# Patient Record
Sex: FEMALE | Race: WHITE | NOT HISPANIC OR LATINO | Employment: FULL TIME | URBAN - METROPOLITAN AREA
[De-identification: names, ages, dates, MRNs, and addresses within clinical notes are randomized per-mention and may not be internally consistent; named-entity substitution may affect disease eponyms.]

---

## 2024-06-24 ENCOUNTER — TELEPHONE (OUTPATIENT)
Dept: OBSTETRICS AND GYNECOLOGY | Facility: CLINIC | Age: 29
End: 2024-06-24

## 2024-06-24 NOTE — TELEPHONE ENCOUNTER
Hub staff attempted to follow warm transfer process and was unsuccessful     Caller: LUKAS ALLISON    Relationship to patient: SELF    Best call back number: [phone]    Patient is needing: PT IS CALLING IN NEEDING TO RESCHEDULE HER OB APPT DUE TO HER [place] BEING [place] OF [place]OWN. SHE WAS TOLD THERE WAS AN APPT AVAILABLE ON 07/11 @ 12:30 PM AND WOULD LIKE TO KNOW IF THIS WAS STILL AVAILABLE

## 2024-07-11 ENCOUNTER — INITIAL PRENATAL (OUTPATIENT)
Dept: OBSTETRICS AND GYNECOLOGY | Facility: CLINIC | Age: 29
End: 2024-07-11
Payer: COMMERCIAL

## 2024-07-11 VITALS — WEIGHT: 142.6 LBS | SYSTOLIC BLOOD PRESSURE: 110 MMHG | DIASTOLIC BLOOD PRESSURE: 68 MMHG

## 2024-07-11 DIAGNOSIS — Z98.890 HISTORY OF LOOP ELECTRICAL EXCISION PROCEDURE (LEEP): ICD-10-CM

## 2024-07-11 DIAGNOSIS — Z34.91 PRENATAL CARE IN FIRST TRIMESTER: Primary | ICD-10-CM

## 2024-07-11 DIAGNOSIS — Z3A.08 8 WEEKS GESTATION OF PREGNANCY: ICD-10-CM

## 2024-07-11 PROBLEM — Z34.90 PREGNANCY: Status: ACTIVE | Noted: 2024-07-11

## 2024-07-11 RX ORDER — CETIRIZINE HYDROCHLORIDE 10 MG/1
10 TABLET ORAL DAILY
COMMUNITY

## 2024-07-11 RX ORDER — TACROLIMUS 1 MG/G
OINTMENT TOPICAL
COMMUNITY
Start: 2024-06-13

## 2024-07-11 RX ORDER — BACILLUS COAGULANS/INULIN 1B-250 MG
CAPSULE ORAL
COMMUNITY

## 2024-07-11 RX ORDER — CICLOPIROX 7.7 MG/G
GEL TOPICAL
COMMUNITY
Start: 2024-06-14

## 2024-07-11 NOTE — PROGRESS NOTES
Initial ob visit     CC- Here for care of pregnancy        Ariane Vasquez is a 29 y.o. female, , who presents for her first obstetrical visit.  Patient's last menstrual period was 2024.. Her GISELLE is 2025, by Last Menstrual Period. Current GA is 8w4d.     Initial positive test date : 24, UPT        Her periods are every 4 weeks.  Prior obstetric issues: none  Patient's past medical history is significant for:  Denies .  Family history of genetic issues (includes FOB): Denies  Prior infections concerning in pregnancy (Rash, fever in last 2 weeks): No  Varicella Hx - history of chicken pox  Prior testing for Cystic Fibrosis Carrier or Sickle Cell Trait- Denies  Prepregnancy BMI - There is no height or weight on file to calculate BMI.  History of STD: no  Hx of HSV for patient or partner: no  Ultrasound Today: yes    OB History    Para Term  AB Living   1             SAB IAB Ectopic Molar Multiple Live Births                    # Outcome Date GA Lbr Ashutosh/2nd Weight Sex Type Anes PTL Lv   1 Current                Additional Pertinent History   Last Pap : 2024 Result: negative HPV: negative     Last Completed Pap Smear       This patient has no relevant Health Maintenance data.          History of abnormal Pap smear: yes - LEEP 2018  Family history of uterine, colon, breast, or ovarian cancer: yes - MGM- Breast cancer  Feelings of Anxiety or Depression: no  Tobacco Usage?: No   Alcohol/Drug Use?: NO  Over the age of 35 at delivery: no  Genetic Screening: desires cell free DNA    PMH    Current Outpatient Medications:     Bacillus Coagulans-Inulin (Probiotic) 1-250 BILLION-MG capsule, , Disp: , Rfl:     cetirizine (zyrTEC) 10 MG tablet, Take 1 tablet by mouth Daily., Disp: , Rfl:     ciclopirox (LOPROX) 0.77 % gel, APPLY DAILY TO NAIL PLATES, Disp: , Rfl:     Omega-3 Fatty Acids (Fish Oil) 300 MG capsule, , Disp: , Rfl:     Prenatal Multivit-Min-Fe-FA (PRENATAL 1 + IRON PO), Take   by mouth., Disp: , Rfl:     tacrolimus (PROTOPIC) 0.1 % ointment, apply to affected area twice a day, Disp: , Rfl:      Past Medical History:   Diagnosis Date    Abnormal Pap smear of cervix     HPV (human papilloma virus) infection 2018    S/p colposcopy and leep, All normal paps since that time    Pregnancy 7/11/2024        Past Surgical History:   Procedure Laterality Date    APPENDECTOMY      CERVICAL BIOPSY  W/ LOOP ELECTRODE EXCISION  2018    WISDOM TOOTH EXTRACTION         Review of Systems   Review of Systems    Patient Reports:  Nausea  Patient Denies:excessive nausea , excessive vomiting, and vaginal bleeding  All systems reviewed and otherwise normal.    I have reviewed and agree with the HPI, ROS, and historical information as entered above.       /68   Wt 64.7 kg (142 lb 9.6 oz)   LMP 05/12/2024     The additional following portions of the patient's history were reviewed and updated as appropriate: allergies, current medications, past family history, past medical history, past social history, past surgical history, and problem list.    Physical Exam  General:  well developed; well nourished  no acute distress   Chest/Respiratory: No labored breathing, normal respiratory effort, normal appearance, no respiratory noises noted   Heart:  not examined   Thyroid: not examined   Breasts:  Not performed.   Abdomen: Not performed.   Pelvis: Not performed.        Assessment and Plan    Problem List Items Addressed This Visit       Pregnancy    Overview     cfDNA         History of loop electrical excision procedure (LEEP)    Overview     2018          Other Visit Diagnoses       Prenatal care in first trimester    -  Primary    Relevant Orders    Obstetric Panel    HIV-1 / O / 2 Ag / Antibody    Urine Culture - Urine, Urine, Clean Catch    Chlamydia trachomatis, Neisseria gonorrhoeae, PCR - Urine, Urine, Clean Catch    Urine Drug Screen - Urine, Clean Catch            Pregnancy at 8w4d  Reviewed routine  prenatal care with the office and educational materials given  Lab(s) Ordered  Discussed options for genetic testing including first trimester nuchal translucency screen, genetic disease carrier testing, quadruple screen, and NIPT  Patient is on Prenatal vitamins  Activity recommendation : 150 minutes/week of moderate intensity aerobic activity unless we limit for bleeding, hypertension or other pregnancy complication   Return in about 1 month (around 8/11/2024) for F/U Prenatal.      Tiffani Prince MD  07/11/2024

## 2024-07-12 LAB
ABO GROUP BLD: ABNORMAL
AMPHETAMINES UR QL SCN: NEGATIVE NG/ML
BARBITURATES UR QL SCN: NEGATIVE NG/ML
BASOPHILS # BLD AUTO: 0 X10E3/UL (ref 0–0.2)
BASOPHILS NFR BLD AUTO: 0 %
BENZODIAZ UR QL SCN: NEGATIVE NG/ML
BLD GP AB SCN SERPL QL: NEGATIVE
BZE UR QL SCN: NEGATIVE NG/ML
CANNABINOIDS UR QL SCN: NEGATIVE NG/ML
CREAT UR-MCNC: 157.8 MG/DL (ref 20–300)
EOSINOPHIL # BLD AUTO: 0 X10E3/UL (ref 0–0.4)
EOSINOPHIL NFR BLD AUTO: 1 %
ERYTHROCYTE [DISTWIDTH] IN BLOOD BY AUTOMATED COUNT: 11.7 % (ref 11.7–15.4)
HBV SURFACE AG SERPL QL IA: NEGATIVE
HCT VFR BLD AUTO: 40.9 % (ref 34–46.6)
HCV IGG SERPL QL IA: NON REACTIVE
HGB BLD-MCNC: 13.3 G/DL (ref 11.1–15.9)
HIV 1+2 AB+HIV1 P24 AG SERPL QL IA: NON REACTIVE
IMM GRANULOCYTES # BLD AUTO: 0 X10E3/UL (ref 0–0.1)
IMM GRANULOCYTES NFR BLD AUTO: 0 %
LABORATORY COMMENT REPORT: NORMAL
LYMPHOCYTES # BLD AUTO: 1.8 X10E3/UL (ref 0.7–3.1)
LYMPHOCYTES NFR BLD AUTO: 25 %
MCH RBC QN AUTO: 31.4 PG (ref 26.6–33)
MCHC RBC AUTO-ENTMCNC: 32.5 G/DL (ref 31.5–35.7)
MCV RBC AUTO: 97 FL (ref 79–97)
METHADONE UR QL SCN: NEGATIVE NG/ML
MONOCYTES # BLD AUTO: 0.5 X10E3/UL (ref 0.1–0.9)
MONOCYTES NFR BLD AUTO: 7 %
NEUTROPHILS # BLD AUTO: 4.8 X10E3/UL (ref 1.4–7)
NEUTROPHILS NFR BLD AUTO: 67 %
OPIATES UR QL SCN: NEGATIVE NG/ML
OXYCODONE+OXYMORPHONE UR QL SCN: NEGATIVE NG/ML
PCP UR QL: NEGATIVE NG/ML
PH UR: 5.3 [PH] (ref 4.5–8.9)
PLATELET # BLD AUTO: 244 X10E3/UL (ref 150–450)
PROPOXYPH UR QL SCN: NEGATIVE NG/ML
RBC # BLD AUTO: 4.24 X10E6/UL (ref 3.77–5.28)
RH BLD: POSITIVE
RPR SER QL: NON REACTIVE
RUBV IGG SERPL IA-ACNC: <0.9 INDEX
WBC # BLD AUTO: 7.1 X10E3/UL (ref 3.4–10.8)

## 2024-07-13 LAB
BACTERIA UR CULT: NORMAL
BACTERIA UR CULT: NORMAL
C TRACH RRNA SPEC QL NAA+PROBE: NEGATIVE
N GONORRHOEA RRNA SPEC QL NAA+PROBE: NEGATIVE

## 2024-07-26 ENCOUNTER — LAB (OUTPATIENT)
Dept: OBSTETRICS AND GYNECOLOGY | Facility: CLINIC | Age: 29
End: 2024-07-26
Payer: COMMERCIAL

## 2024-07-26 DIAGNOSIS — Z34.01 PREGNANCY, FIRST, FIRST TRIMESTER: Primary | ICD-10-CM

## 2024-08-09 ENCOUNTER — ROUTINE PRENATAL (OUTPATIENT)
Dept: OBSTETRICS AND GYNECOLOGY | Facility: CLINIC | Age: 29
End: 2024-08-09
Payer: COMMERCIAL

## 2024-08-09 VITALS — WEIGHT: 137.6 LBS | DIASTOLIC BLOOD PRESSURE: 74 MMHG | SYSTOLIC BLOOD PRESSURE: 108 MMHG

## 2024-08-09 DIAGNOSIS — Z34.90 PRENATAL CARE, ANTEPARTUM: Primary | ICD-10-CM

## 2024-08-09 DIAGNOSIS — Z3A.12 12 WEEKS GESTATION OF PREGNANCY: ICD-10-CM

## 2024-08-09 DIAGNOSIS — Z98.890 HISTORY OF LOOP ELECTRICAL EXCISION PROCEDURE (LEEP): ICD-10-CM

## 2024-08-09 LAB
GLUCOSE UR STRIP-MCNC: NEGATIVE MG/DL
PROT UR STRIP-MCNC: NEGATIVE MG/DL

## 2024-08-09 NOTE — PROGRESS NOTES
OB FOLLOW UP  CC- Here for care of pregnancy        Ariane Vasquez is a 29 y.o.  12w5d patient being seen today for her obstetrical follow up visit. Patient reports minimal nausea.     Her prenatal care is complicated by (and status) :   Patient Active Problem List   Diagnosis    Pregnancy    History of loop electrical excision procedure (LEEP)       Genetic testing?: already completed and was normal.  NOB labs reviewed    Ultrasound Today: No    ROS -   Patient Denies: leaking of fluid, vaginal bleeding, dysuria, excessive vomiting, and more than 6 contractions per hour  All other systems reviewed and are negative.     The additional following portions of the patient's history were reviewed and updated as appropriate: allergies, current medications, past family history, past medical history, past social history, past surgical history, and problem list.    I have reviewed and agree with the HPI, ROS, and historical information as entered above. Tiffani Prince MD            /74   Wt 62.4 kg (137 lb 9.6 oz)   LMP 2024         EXAM:     Prenatal Vitals  BP: 108/74  Weight: 62.4 kg (137 lb 9.6 oz)   Fetal Heart Rate: +          Urine Glucose Read-only: Negative  Urine Protein Read-only: Negative       Assessment and Plan    Problem List Items Addressed This Visit       Pregnancy    Overview     cfDNA low risk         History of loop electrical excision procedure (LEEP)    Overview     2018          Other Visit Diagnoses       Prenatal care, antepartum    -  Primary    Relevant Orders    POC Urinalysis Dipstick (Completed)            Pregnancy at 12w5d  Labs reviewed from New OB Visit.  Counseled on genetic testing, carrier status and option for NT screen- done and low risk  Activity and Exercise discussed.  Patient is on Prenatal vitamins  Return in about 1 month (around 2024) for F/U Prenatal.    Tiffani Prince MD  2024

## 2024-09-10 ENCOUNTER — ROUTINE PRENATAL (OUTPATIENT)
Dept: OBSTETRICS AND GYNECOLOGY | Facility: CLINIC | Age: 29
End: 2024-09-10
Payer: COMMERCIAL

## 2024-09-10 VITALS — WEIGHT: 147 LBS | DIASTOLIC BLOOD PRESSURE: 60 MMHG | SYSTOLIC BLOOD PRESSURE: 100 MMHG

## 2024-09-10 DIAGNOSIS — Z34.92 SECOND TRIMESTER PREGNANCY: Primary | ICD-10-CM

## 2024-09-10 DIAGNOSIS — Z3A.17 17 WEEKS GESTATION OF PREGNANCY: ICD-10-CM

## 2024-09-10 DIAGNOSIS — Z98.890 HISTORY OF LOOP ELECTRICAL EXCISION PROCEDURE (LEEP): ICD-10-CM

## 2024-09-10 LAB
GLUCOSE UR STRIP-MCNC: NEGATIVE MG/DL
PROT UR STRIP-MCNC: NEGATIVE MG/DL

## 2024-09-10 PROCEDURE — 0502F SUBSEQUENT PRENATAL CARE: CPT | Performed by: OBSTETRICS & GYNECOLOGY

## 2024-09-10 NOTE — PROGRESS NOTES
OB FOLLOW UP  CC- Here for care of pregnancy        Ariane Vasquez is a 29 y.o.  17w2d patient being seen today for her obstetrical follow up visit. Patient reports no complaints. Pt feels well, AFP Only tdy, no c/o    Her prenatal care is complicated by (and status) : see below.  Patient Active Problem List   Diagnosis    Pregnancy    History of loop electrical excision procedure (LEEP)       Flu Status: Already given in current flu season  Ultrasound Today: No    AFP: desires AFP ONLY    ROS -   Patient Denies: leaking of fluid, vaginal bleeding, dysuria, excessive vomiting, and more than 6 contractions per hour  All other systems reviewed and are negative.       The additional following portions of the patient's history were reviewed and updated as appropriate: allergies, current medications, past family history, past medical history, past social history, past surgical history, and problem list.      I have reviewed and agree with the HPI, ROS, and historical information as entered above. Tiffani Prince MD          EXAM:     Prenatal Vitals  BP: 100/60  Weight: 66.7 kg (147 lb)   Fetal Heart Rate: +         Urine Glucose Read-only: Negative  Urine Protein Read-only: Negative           Assessment and Plan    Problem List Items Addressed This Visit       Pregnancy    Overview     cfDNA low risk, AFP         History of loop electrical excision procedure (LEEP)    Overview     2018          Other Visit Diagnoses       Second trimester pregnancy    -  Primary    Relevant Orders    POC Urinalysis Dipstick (Completed)    Alpha Fetoprotein, Maternal    US Ob 14 + Weeks Single or First Gestation            Pregnancy at 17w2d  Fetal status reassuring.   Counseled on MSAFP alone in relation to OTD and placental issues. Desires today   Anatomy scan next visit.   Activity and Exercise discussed.  Patient is on Prenatal vitamins  Return in about 3 weeks (around 10/1/2024) for F/U Prenatal, U/S Next Visit.    Tiffani  MD Suresh  09/10/2024

## 2024-09-12 LAB
AFP INTERP SERPL-IMP: NORMAL
AFP INTERP SERPL-IMP: NORMAL
AFP MOM SERPL: 1.19
AFP SERPL-MCNC: 46.6 NG/ML
AGE AT DELIVERY: 32 YR
GA METHOD: NORMAL
GA: 17.2 WEEKS
IDDM PATIENT QL: NO
LABORATORY COMMENT REPORT: NORMAL
MULTIPLE PREGNANCY: NO
NEURAL TUBE DEFECT RISK FETUS: 6704 %
RESULT: NORMAL

## 2024-10-04 ENCOUNTER — ROUTINE PRENATAL (OUTPATIENT)
Dept: OBSTETRICS AND GYNECOLOGY | Facility: CLINIC | Age: 29
End: 2024-10-04
Payer: COMMERCIAL

## 2024-10-04 VITALS — DIASTOLIC BLOOD PRESSURE: 62 MMHG | WEIGHT: 148.2 LBS | SYSTOLIC BLOOD PRESSURE: 100 MMHG

## 2024-10-04 DIAGNOSIS — Z36.2 ENCOUNTER FOR FOLLOW-UP ULTRASOUND OF FETAL ANATOMY: ICD-10-CM

## 2024-10-04 DIAGNOSIS — Z34.02 FIRST PREGNANCY, SECOND TRIMESTER: Primary | ICD-10-CM

## 2024-10-04 DIAGNOSIS — O43.109 PLACENTAL ABNORMALITY, ANTEPARTUM: ICD-10-CM

## 2024-10-04 LAB
GLUCOSE UR STRIP-MCNC: NEGATIVE MG/DL
PROT UR STRIP-MCNC: NEGATIVE MG/DL

## 2024-10-04 NOTE — PROGRESS NOTES
OB FOLLOW UP  CC- Here for care of pregnancy        Ariane Vasquez is a 29 y.o.  20w5d patient being seen today for her obstetrical follow up visit. Patient reports no complaints.     Her prenatal care is complicated by (and status) :  no  Patient Active Problem List   Diagnosis    Pregnancy    History of loop electrical excision procedure (LEEP)       Flu Status: Will give in office today  Ultrasound Today: Yes  AFP was already completed and was normal.    ROS -     Patient Denies: leaking of fluid, vaginal bleeding, dysuria, excessive vomiting, and more than 6 contractions per hour  Fetal Movement : Yes  All other systems reviewed and are negative.       The additional following portions of the patient's history were reviewed and updated as appropriate: allergies, current medications, past family history, past medical history, past social history, past surgical history, and problem list.      I have reviewed and agree with the HPI, ROS, and historical information as entered above. Eugenia Solis, APRN      /62   Wt 67.2 kg (148 lb 3.2 oz)   LMP 2024       EXAM:     Prenatal Vitals  BP: 100/62  Weight: 67.2 kg (148 lb 3.2 oz)   Fetal Heart Rate: US          Urine Glucose Read-only: Negative  Urine Protein Read-only: Negative       Assessment and Plan    Problem List Items Addressed This Visit    None  Visit Diagnoses       First pregnancy, second trimester    -  Primary    Relevant Orders    POC Urinalysis Dipstick (Completed)    Encounter for follow-up ultrasound of fetal anatomy        Relevant Orders    US Ob Follow Up Transabdominal Approach    Placental abnormality, antepartum        Relevant Orders    US Ob Follow Up Transabdominal Approach            Pregnancy at 20w5d  Anatomy scan today is incomplete with abnormal findings of prominent placental lake follow up in 4 weeks  Fetal status reassuring.   Activity and Exercise discussed.  U/S ordered at follow up  Patient is on Prenatal  vitamins  Follow up in four weeks   Eugenia Solis, APRN  10/04/2024

## 2024-11-01 ENCOUNTER — ROUTINE PRENATAL (OUTPATIENT)
Dept: OBSTETRICS AND GYNECOLOGY | Facility: CLINIC | Age: 29
End: 2024-11-01
Payer: COMMERCIAL

## 2024-11-01 VITALS — SYSTOLIC BLOOD PRESSURE: 108 MMHG | WEIGHT: 157.2 LBS | DIASTOLIC BLOOD PRESSURE: 68 MMHG

## 2024-11-01 DIAGNOSIS — Z34.90 PRENATAL CARE, ANTEPARTUM: Primary | ICD-10-CM

## 2024-11-01 DIAGNOSIS — Z3A.24 24 WEEKS GESTATION OF PREGNANCY: ICD-10-CM

## 2024-11-01 DIAGNOSIS — Z98.890 HISTORY OF LOOP ELECTRICAL EXCISION PROCEDURE (LEEP): ICD-10-CM

## 2024-11-01 LAB
GLUCOSE UR STRIP-MCNC: NEGATIVE MG/DL
PROT UR STRIP-MCNC: NEGATIVE MG/DL

## 2024-11-01 NOTE — PROGRESS NOTES
OB FOLLOW UP  CC- Here for care of pregnancy        Ariane Vasquez is a 29 y.o.  24w5d patient being seen today for her obstetrical follow up visit. Patient reports no complaints.     Her prenatal care is complicated by (and status) :   Patient Active Problem List   Diagnosis    Pregnancy    History of loop electrical excision procedure (LEEP)       Flu Status: Already given in current flu season  Ultrasound Today: Yes, follow up anatomy  Reviewed 1 hr glucose testing and TDAP next visit.    ROS -   Patient Denies: leaking of fluid, vaginal bleeding, dysuria, excessive vomiting, and more than 6 contractions per hour  Fetal Movement : normal  All other systems reviewed and are negative.       The additional following portions of the patient's history were reviewed and updated as appropriate: allergies, current medications, past family history, past medical history, past social history, past surgical history, and problem list.      I have reviewed and agree with the HPI, ROS, and historical information as entered above. Tiffani Prince MD      /68   Wt 71.3 kg (157 lb 3.2 oz)   LMP 2024       EXAM:     Prenatal Vitals  BP: 108/68  Weight: 71.3 kg (157 lb 3.2 oz)   Fetal Heart Rate: 143               Urine Glucose Read-only: Negative  Urine Protein Read-only: Negative       Assessment and Plan    Problem List Items Addressed This Visit       Pregnancy    Overview     cfDNA low risk, AFP neg         History of loop electrical excision procedure (LEEP)    Overview     2018          Other Visit Diagnoses       Prenatal care, antepartum    -  Primary    Relevant Orders    POC Urinalysis Dipstick (Completed)            Pregnancy at 24w5d  Fetal status reassuring.  anatomy scan completed today and within normal limits.  1 hour gtt, CBC, Antibody screen, TDAP, and RPR next visit. Instructions given  Discussed/encouraged TDAP vaccination after 28 weeks  Activity and Exercise discussed.  Return in about 1  month (around 12/1/2024) for F/U Prenatal, and glucola.      Tiffani Prince MD  11/01/2024

## 2024-12-06 ENCOUNTER — ROUTINE PRENATAL (OUTPATIENT)
Dept: OBSTETRICS AND GYNECOLOGY | Facility: CLINIC | Age: 29
End: 2024-12-06
Payer: COMMERCIAL

## 2024-12-06 VITALS — WEIGHT: 169.6 LBS | SYSTOLIC BLOOD PRESSURE: 106 MMHG | DIASTOLIC BLOOD PRESSURE: 68 MMHG

## 2024-12-06 DIAGNOSIS — O26.849 UTERINE SIZE DATE DISCREPANCY PREGNANCY: ICD-10-CM

## 2024-12-06 DIAGNOSIS — Z34.90 PRENATAL CARE, ANTEPARTUM: Primary | ICD-10-CM

## 2024-12-06 DIAGNOSIS — Z3A.29 29 WEEKS GESTATION OF PREGNANCY: ICD-10-CM

## 2024-12-06 DIAGNOSIS — Z98.890 HISTORY OF LOOP ELECTRICAL EXCISION PROCEDURE (LEEP): ICD-10-CM

## 2024-12-06 LAB
GLUCOSE UR STRIP-MCNC: NEGATIVE MG/DL
PROT UR STRIP-MCNC: NEGATIVE MG/DL

## 2024-12-06 RX ORDER — FLUTICASONE PROPIONATE 0.05 %
CREAM (GRAM) TOPICAL
COMMUNITY
Start: 2024-11-12

## 2024-12-06 RX ORDER — DOXYLAMINE SUCCINATE 25 MG/1
TABLET ORAL
COMMUNITY

## 2024-12-06 NOTE — PROGRESS NOTES
OB FOLLOW UP  CC- Here for care of pregnancy        Ariane Vasquez is a 29 y.o.  29w5d patient being seen today for her obstetrical follow up. Patient reports no complaints.     Patient undergoing Glucola testing today. She is due for her testing at 1023.       MBT: O+  Rhogam: is not indicated.  28 week packet: reviewed with patient , counseled on fetal movement , pediatrician list reviewed, breast pump discussed, and childbirth classes reviewed  TDAP: out of stock  Flu Status: Already given in current flu season  Ultrasound Today: No    Her prenatal care is complicated by (and status) :   Patient Active Problem List   Diagnosis    Pregnancy    History of loop electrical excision procedure (LEEP)         ROS -   Patient Denies: Loss of Fluid, Vaginal Spotting, Vision Changes, Headaches, Nausea , Vomiting , Contractions, Epigastric pain, and skin itching  Fetal Movement : normal    The additional following portions of the patient's history were reviewed and updated as appropriate: allergies, current medications, past family history, past medical history, past social history, past surgical history, and problem list.    I have reviewed and agree with the HPI, ROS, and historical information as entered above. Tiffani Prince MD      /68   Wt 76.9 kg (169 lb 9.6 oz)   LMP 2024         EXAM:     Prenatal Vitals  BP: 106/68  Weight: 76.9 kg (169 lb 9.6 oz)   Fetal Heart Rate: +      Fundal Height (cm): 28 cm        Urine Glucose Read-only: Negative  Urine Protein Read-only: Negative         Assessment and Plan    Problem List Items Addressed This Visit       Pregnancy    Overview     cfDNA low risk, AFP neg         History of loop electrical excision procedure (LEEP)    Overview     2018          Other Visit Diagnoses       Prenatal care, antepartum    -  Primary    Relevant Orders    CBC (No Diff)    Gestational Screen 1 Hr ([facility])    Antibody Screen    RPR, Rfx Qn RPR / Confirm TP    POC  Urinalysis Dipstick (Completed)    Uterine size date discrepancy pregnancy        Relevant Orders    US Ob Follow Up Transabdominal Approach            Pregnancy at 29w5d  1 hr Glucola, CBC, RPR. Antibody screen and TDAP next visit  Fetal movement/PTL or Labor precautions  U/S ordered at follow up  Activity and Exercise discussed.  Return in about 3 weeks (around 12/27/2024) for F/U Prenatal, U/S Next Visit.        Tiffani Prince MD  12/06/2024

## 2024-12-07 LAB
BLD GP AB SCN SERPL QL: NEGATIVE
ERYTHROCYTE [DISTWIDTH] IN BLOOD BY AUTOMATED COUNT: 12 % (ref 12.3–15.4)
GLUCOSE 1H P 50 G GLC PO SERPL-MCNC: 106 MG/DL (ref 65–139)
HCT VFR BLD AUTO: 37.6 % (ref 34–46.6)
HGB BLD-MCNC: 12.6 G/DL (ref 12–15.9)
MCH RBC QN AUTO: 32.7 PG (ref 26.6–33)
MCHC RBC AUTO-ENTMCNC: 33.5 G/DL (ref 31.5–35.7)
MCV RBC AUTO: 97.7 FL (ref 79–97)
PLATELET # BLD AUTO: 211 10*3/MM3 (ref 140–450)
RBC # BLD AUTO: 3.85 10*6/MM3 (ref 3.77–5.28)
RPR SER QL: NON REACTIVE
WBC # BLD AUTO: 10.2 10*3/MM3 (ref 3.4–10.8)

## 2024-12-27 ENCOUNTER — ROUTINE PRENATAL (OUTPATIENT)
Dept: OBSTETRICS AND GYNECOLOGY | Facility: CLINIC | Age: 29
End: 2024-12-27
Payer: COMMERCIAL

## 2024-12-27 VITALS — DIASTOLIC BLOOD PRESSURE: 80 MMHG | WEIGHT: 171.2 LBS | SYSTOLIC BLOOD PRESSURE: 110 MMHG

## 2024-12-27 DIAGNOSIS — Z3A.32 32 WEEKS GESTATION OF PREGNANCY: ICD-10-CM

## 2024-12-27 DIAGNOSIS — Z34.90 PRENATAL CARE, ANTEPARTUM: Primary | ICD-10-CM

## 2024-12-27 LAB
GLUCOSE UR STRIP-MCNC: NEGATIVE MG/DL
PROT UR STRIP-MCNC: NEGATIVE MG/DL

## 2024-12-27 NOTE — PROGRESS NOTES
OB FOLLOW UP  CC- Here for care of pregnancy        Ariane Vasquez is a 29 y.o.  32w5d patient being seen today for her obstetrical follow up visit. Patient reports no complaints.     Her prenatal care is complicated by (and status) :    Patient Active Problem List   Diagnosis    Pregnancy    History of loop electrical excision procedure (LEEP)       Flu Status: Already given in current flu season  TDAP status: given today  RSV vaccine: would like to discuss further.  Rhogam status: was not indicated  28 week labs: Reviewed and normal  Ultrasound Today: Yes EFW 62% vertex  Non Stress Test: No.      ROS -   Patient Denies: Loss of Fluid, Vaginal Spotting, Vision Changes, Headaches, Nausea , Vomiting , Contractions, Epigastric pain, and skin itching  Fetal Movement : normal  All other systems reviewed and are negative.       The additional following portions of the patient's history were reviewed and updated as appropriate: allergies, current medications, past family history, past medical history, past social history, past surgical history, and problem list.    I have reviewed and agree with the HPI, ROS, and historical information as entered above. Tiffani Prince MD      /80   Wt 77.7 kg (171 lb 3.2 oz)   LMP 2024         EXAM:     Prenatal Vitals  BP: 110/80  Weight: 77.7 kg (171 lb 3.2 oz)   Fetal Heart Rate: 129               Urine Glucose Read-only: Negative  Urine Protein Read-only: Negative           Assessment and Plan    Problem List Items Addressed This Visit       Pregnancy    Overview     cfDNA low risk, AFP neg  EFW 32 wks  62%ile, AC 86%          Other Visit Diagnoses       Prenatal care, antepartum    -  Primary    Relevant Orders    POC Urinalysis Dipstick (Completed)            Pregnancy at 32w5d US today normal growth and fluid.   Fetal status reassuring.  28 week labs reviewed.    Activity and Exercise discussed.  Fetal movement/PTL or Labor precautions  Return in about 2  weeks (around 1/10/2025) for F/U Prenatal.    Tiffani Prince MD  12/27/2024

## 2025-01-10 ENCOUNTER — ROUTINE PRENATAL (OUTPATIENT)
Dept: OBSTETRICS AND GYNECOLOGY | Facility: CLINIC | Age: 30
End: 2025-01-10
Payer: COMMERCIAL

## 2025-01-10 VITALS — DIASTOLIC BLOOD PRESSURE: 62 MMHG | SYSTOLIC BLOOD PRESSURE: 106 MMHG | WEIGHT: 173.2 LBS

## 2025-01-10 DIAGNOSIS — Z98.890 HISTORY OF LOOP ELECTRICAL EXCISION PROCEDURE (LEEP): ICD-10-CM

## 2025-01-10 DIAGNOSIS — N89.8 VAGINAL DISCHARGE DURING PREGNANCY IN THIRD TRIMESTER: ICD-10-CM

## 2025-01-10 DIAGNOSIS — Z34.93 PRENATAL CARE IN THIRD TRIMESTER: Primary | ICD-10-CM

## 2025-01-10 DIAGNOSIS — Z3A.34 34 WEEKS GESTATION OF PREGNANCY: ICD-10-CM

## 2025-01-10 DIAGNOSIS — N89.8 VAGINAL ODOR: ICD-10-CM

## 2025-01-10 DIAGNOSIS — O26.893 VAGINAL DISCHARGE DURING PREGNANCY IN THIRD TRIMESTER: ICD-10-CM

## 2025-01-10 LAB
GLUCOSE UR STRIP-MCNC: NEGATIVE MG/DL
PROT UR STRIP-MCNC: NEGATIVE MG/DL

## 2025-01-10 NOTE — PROGRESS NOTES
OB FOLLOW UP  CC- Here for care of pregnancy        Ariane Vasquez is a 29 y.o.  34w5d patient being seen today for her obstetrical follow up visit. Patient reports creamy white vaginal discharge with an ammonia odor.     Her prenatal care is complicated by (and status) : see below.  Patient Active Problem List   Diagnosis    Pregnancy    History of loop electrical excision procedure (LEEP)       Flu Status: Already given in current flu season  RSV:  Given today  Ultrasound Today: No  Non Stress Test: No    ROS -   Patient Denies: Loss of Fluid, Vaginal Spotting, Vision Changes, Headaches, Nausea , Vomiting , Contractions, Epigastric pain, and skin itching  Fetal Movement : normal  All other systems reviewed and are negative.       The additional following portions of the patient's history were reviewed and updated as appropriate: allergies and current medications.    I have reviewed and agree with the HPI, ROS, and historical information as entered above. Eugenia Solis, APRN      /62   Wt 78.6 kg (173 lb 3.2 oz)   LMP 2024       EXAM:     Prenatal Vitals  BP: 106/62  Weight: 78.6 kg (173 lb 3.2 oz)   Fetal Heart Rate: 138           Urine Glucose Read-only: Negative  Urine Protein Read-only: Negative     Assessment and Plan    Problem List Items Addressed This Visit          Genitourinary and Reproductive     History of loop electrical excision procedure (LEEP)    Overview     2018            Gravid and     Pregnancy    Overview     cfDNA low risk, AFP neg  EFW 32 wks  62%ile, AC 86%          Other Visit Diagnoses       Prenatal care in third trimester    -  Primary    Relevant Orders    POC Urinalysis Dipstick (Completed)    ABRYSVO RSV Vaccine (Adults 60+, pregnant women 32-36 wks) (Completed)    Vaginal discharge during pregnancy in third trimester        Relevant Orders    NuSwab VG+ - Swab, Cervix    Vaginal odor        Relevant Orders    NuSwab VG+ - Swab, Cervix             Pregnancy at 34w5d  Fetal status reassuring.   Activity and Exercise discussed.  Fetal movement/PTL or Labor precautions  Lab(s) Ordered  Medication(s) Ordered  Patient is on Prenatal vitamins  Reviewed Pre-eclampsia signs/symptoms  GBS next visit  Nuswab for vaginal discharge  RSV vaccine given today  Follow up in two weeks MARIA L Solis, APRN  01/10/2025

## 2025-01-13 LAB
A VAGINAE DNA VAG QL NAA+PROBE: NORMAL SCORE
BVAB2 DNA VAG QL NAA+PROBE: NORMAL SCORE
C ALBICANS DNA VAG QL NAA+PROBE: NEGATIVE
C GLABRATA DNA VAG QL NAA+PROBE: NEGATIVE
C TRACH DNA SPEC QL NAA+PROBE: NEGATIVE
MEGA1 DNA VAG QL NAA+PROBE: NORMAL SCORE
N GONORRHOEA DNA VAG QL NAA+PROBE: NEGATIVE
T VAGINALIS DNA VAG QL NAA+PROBE: NEGATIVE

## 2025-01-24 ENCOUNTER — ROUTINE PRENATAL (OUTPATIENT)
Dept: OBSTETRICS AND GYNECOLOGY | Facility: CLINIC | Age: 30
End: 2025-01-24
Payer: OTHER GOVERNMENT

## 2025-01-24 ENCOUNTER — LAB (OUTPATIENT)
Dept: LAB | Facility: HOSPITAL | Age: 30
End: 2025-01-24
Payer: OTHER GOVERNMENT

## 2025-01-24 VITALS — DIASTOLIC BLOOD PRESSURE: 72 MMHG | SYSTOLIC BLOOD PRESSURE: 112 MMHG | WEIGHT: 174.2 LBS

## 2025-01-24 DIAGNOSIS — Z34.93 PRENATAL CARE IN THIRD TRIMESTER: Primary | ICD-10-CM

## 2025-01-24 DIAGNOSIS — Z3A.36 36 WEEKS GESTATION OF PREGNANCY: ICD-10-CM

## 2025-01-24 DIAGNOSIS — Z98.890 HISTORY OF LOOP ELECTRICAL EXCISION PROCEDURE (LEEP): ICD-10-CM

## 2025-01-24 DIAGNOSIS — Z34.93 THIRD TRIMESTER PREGNANCY: Primary | ICD-10-CM

## 2025-01-24 LAB
GLUCOSE UR STRIP-MCNC: NEGATIVE MG/DL
PROT UR STRIP-MCNC: NEGATIVE MG/DL

## 2025-01-24 PROCEDURE — 87081 CULTURE SCREEN ONLY: CPT

## 2025-01-24 NOTE — PROGRESS NOTES
OB FOLLOW UP  CC- Here for care of pregnancy        Ariane Vasquez is a 29 y.o.  36w5d patient being seen today for her obstetrical follow up visit. Patient reports no complaints.     Her prenatal care is complicated by (and status) : see below.  Patient Active Problem List   Diagnosis    Pregnancy    History of loop electrical excision procedure (LEEP)       GBS Status: Done Today. She is not allergic to PCN.    Allergies   Allergen Reactions    Sulfa Antibiotics Rash          Flu Status: Already given in current flu season  RSV status: already given   Her Delivery Plan is: Undecided    US today: no  Non Stress Test: No.    ROS -   Patient Denies: Loss of Fluid, Vaginal Spotting, Vision Changes, Headaches, Contractions, Epigastric pain, and skin itching  Fetal Movement : normal  All other systems reviewed and are negative.       The additional following portions of the patient's history were reviewed and updated as appropriate: allergies, current medications, past family history, past medical history, past social history, past surgical history, and problem list.    I have reviewed and agree with the HPI, ROS, and historical information as entered above. Tiffani Prince MD        EXAM:     Prenatal Vitals  BP: 112/72  Weight: 79 kg (174 lb 3.2 oz)   Fetal Heart Rate: +              Urine Glucose Read-only: Negative  Urine Protein Read-only: Negative           Assessment and Plan    Problem List Items Addressed This Visit       Pregnancy    Overview     cfDNA low risk, AFP neg  EFW 32 wks  62%ile, AC 86%         History of loop electrical excision procedure (LEEP)    Overview     2018          Other Visit Diagnoses       Prenatal care in third trimester    -  Primary    Relevant Orders    POC Urinalysis Dipstick (Completed)    Group B Streptococcus Culture - Swab, Vaginal/Rectum            Pregnancy at 36w5d  Fetal status reassuring.   Reviewed Pre-eclampsia signs/symptoms  Discussed options for IOL. Patient  desires spontaneous labor. Would like to postpone an IOL unless medically indicated.   Delivery options reviewed with patient  Signs of labor reviewed  Kick counts reviewed  Activity and Exercise discussed.  Return in about 1 week (around 1/31/2025) for F/U Prenatal.    Tiffani Prince MD  01/24/2025

## 2025-01-27 LAB — BACTERIA SPEC AEROBE CULT: NORMAL

## 2025-01-31 ENCOUNTER — ROUTINE PRENATAL (OUTPATIENT)
Dept: OBSTETRICS AND GYNECOLOGY | Facility: CLINIC | Age: 30
End: 2025-01-31
Payer: OTHER GOVERNMENT

## 2025-01-31 VITALS — DIASTOLIC BLOOD PRESSURE: 76 MMHG | WEIGHT: 174.2 LBS | SYSTOLIC BLOOD PRESSURE: 110 MMHG

## 2025-01-31 DIAGNOSIS — Z34.93 PRENATAL CARE IN THIRD TRIMESTER: Primary | ICD-10-CM

## 2025-01-31 DIAGNOSIS — Z3A.37 37 WEEKS GESTATION OF PREGNANCY: ICD-10-CM

## 2025-01-31 LAB
GLUCOSE UR STRIP-MCNC: NEGATIVE MG/DL
PROT UR STRIP-MCNC: NEGATIVE MG/DL

## 2025-01-31 NOTE — PROGRESS NOTES
OB FOLLOW UP  CC- Here for care of pregnancy        Ariane Vasquez is a 29 y.o.  37w5d patient being seen today for her obstetrical follow up visit. Patient reports intermittent eva esposito.     Her prenatal care is complicated by (and status) : see below.  Patient Active Problem List   Diagnosis    Pregnancy    History of loop electrical excision procedure (LEEP)       GBS Status:   Group B Strep Culture   Date Value Ref Range Status   2025 No Group B Streptococcus isolated  Final         Allergies   Allergen Reactions    Sulfa Antibiotics Rash          Flu Status: Already given in current flu season  Her Delivery Plan is: Undecided    US today: no  Non Stress Test: No.    ROS -   Patient Denies: Loss of Fluid, Vaginal Spotting, Vision Changes, Headaches, Contractions, Epigastric pain, and skin itching  Fetal Movement : normal  Other than what is documented in the HPI, all other systems reviewed and are negative.       The additional following portions of the patient's history were reviewed and updated as appropriate: allergies, current medications, past family history, past medical history, past social history, past surgical history, and problem list.    I have reviewed and agree with the HPI, ROS, and historical information as entered above. Tiffani Prince MD        EXAM:     Prenatal Vitals  BP: 110/76  Weight: 79 kg (174 lb 3.2 oz)   Fetal Heart Rate: +              Urine Glucose Read-only: Negative  Urine Protein Read-only: Negative           Assessment and Plan    Problem List Items Addressed This Visit       Pregnancy    Overview     cfDNA low risk, AFP neg  EFW 32 wks  62%ile, AC 86%          Other Visit Diagnoses       Prenatal care in third trimester    -  Primary    Relevant Orders    POC Urinalysis Dipstick (Completed)            Pregnancy at 37w5d  Fetal status reassuring.   Reviewed Pre-eclampsia signs/symptoms  Discussed options for IOL. Patient desires spontaneous labor. Would like  to postpone an IOL unless medically indicated.   Delivery options reviewed with patient  Signs of labor reviewed  Kick counts reviewed  Activity and Exercise discussed.  Return in about 1 week (around 2/7/2025) for F/U Prenatal.    Tiffani Prince MD  01/31/2025

## 2025-02-06 ENCOUNTER — ROUTINE PRENATAL (OUTPATIENT)
Dept: OBSTETRICS AND GYNECOLOGY | Facility: CLINIC | Age: 30
End: 2025-02-06
Payer: OTHER GOVERNMENT

## 2025-02-06 VITALS — SYSTOLIC BLOOD PRESSURE: 106 MMHG | DIASTOLIC BLOOD PRESSURE: 78 MMHG | WEIGHT: 178.8 LBS

## 2025-02-06 DIAGNOSIS — Z3A.38 38 WEEKS GESTATION OF PREGNANCY: ICD-10-CM

## 2025-02-06 DIAGNOSIS — Z98.890 HISTORY OF LOOP ELECTRICAL EXCISION PROCEDURE (LEEP): ICD-10-CM

## 2025-02-06 DIAGNOSIS — Z34.93 PRENATAL CARE IN THIRD TRIMESTER: Primary | ICD-10-CM

## 2025-02-06 LAB
GLUCOSE UR STRIP-MCNC: NEGATIVE MG/DL
PROT UR STRIP-MCNC: NEGATIVE MG/DL

## 2025-02-06 NOTE — PROGRESS NOTES
OB FOLLOW UP  CC- Here for care of pregnancy        Ariane Vasquez is a 29 y.o.  38w4d patient being seen today for her obstetrical follow up visit. Patient reports pitting edema in her lower extremities by the end of the day. She has been wearing compression socks.     Her prenatal care is complicated by (and status) :   Patient Active Problem List   Diagnosis    Pregnancy    History of loop electrical excision procedure (LEEP)       GBS Status:   Group B Strep Culture   Date Value Ref Range Status   2025 No Group B Streptococcus isolated  Final         Allergies   Allergen Reactions    Sulfa Antibiotics Rash          Flu Status: Already given in current flu season  Her Delivery Plan is: Does not desire IOL    US today: no  Non Stress Test: No.    ROS -   Patient Denies: Loss of Fluid, Vaginal Spotting, Vision Changes, Headaches, Nausea , Vomiting , Contractions, Epigastric pain, and skin itching  Fetal Movement : normal  Other than what is documented in the HPI, all other systems reviewed and are negative.       The additional following portions of the patient's history were reviewed and updated as appropriate: allergies, current medications, past family history, past medical history, past social history, past surgical history, and problem list.    I have reviewed and agree with the HPI, ROS, and historical information as entered above. Tiffani Prince MD        EXAM:     Prenatal Vitals  BP: 106/78  Weight: 81.1 kg (178 lb 12.8 oz)   Fetal Heart Rate: +       Dilation/Effacement/Station  Dilation: 1  Effacement (%): 70      Urine Glucose Read-only: Negative  Urine Protein Read-only: Negative           Assessment and Plan    Problem List Items Addressed This Visit       Pregnancy    Overview     cfDNA low risk, AFP neg  EFW 32 wks  62%ile, AC 86%         History of loop electrical excision procedure (LEEP)    Overview     2018          Other Visit Diagnoses       Prenatal care in third trimester     -  Primary    Relevant Orders    POC Urinalysis Dipstick (Completed)            Pregnancy at 38w4d  Fetal status reassuring.   Reviewed Pre-eclampsia signs/symptoms  Delivery options reviewed with patient  Signs of labor reviewed  Kick counts reviewed  Activity and Exercise discussed.  Return in about 1 week (around 2/13/2025) for F/U Prenatal.    Tiffani Prince MD  02/06/2025

## 2025-02-13 ENCOUNTER — HOSPITAL ENCOUNTER (INPATIENT)
Facility: HOSPITAL | Age: 30
LOS: 3 days | Discharge: HOME | End: 2025-02-16
Attending: OBSTETRICS & GYNECOLOGY | Admitting: OBSTETRICS & GYNECOLOGY
Payer: OTHER GOVERNMENT

## 2025-02-13 ENCOUNTER — ANESTHESIA (OUTPATIENT)
Dept: LABOR AND DELIVERY | Facility: HOSPITAL | Age: 30
End: 2025-02-13
Payer: OTHER GOVERNMENT

## 2025-02-13 ENCOUNTER — ANESTHESIA EVENT (OUTPATIENT)
Dept: LABOR AND DELIVERY | Facility: HOSPITAL | Age: 30
End: 2025-02-13
Payer: OTHER GOVERNMENT

## 2025-02-13 LAB
ABO GROUP BLD: NORMAL
ABO GROUP BLD: NORMAL
ALP SERPL-CCNC: 219 U/L (ref 39–117)
ALT SERPL W P-5'-P-CCNC: 10 U/L (ref 1–33)
AST SERPL-CCNC: 24 U/L (ref 1–32)
BILIRUB SERPL-MCNC: 0.9 MG/DL (ref 0–1.2)
BLD GP AB SCN SERPL QL: NEGATIVE
CREAT SERPL-MCNC: 0.74 MG/DL (ref 0.57–1)
DEPRECATED RDW RBC AUTO: 42.8 FL (ref 37–54)
ERYTHROCYTE [DISTWIDTH] IN BLOOD BY AUTOMATED COUNT: 12.6 % (ref 12.3–15.4)
HCT VFR BLD AUTO: 42.1 % (ref 34–46.6)
HGB BLD-MCNC: 14.8 G/DL (ref 12–15.9)
LDH SERPL-CCNC: 229 U/L (ref 135–214)
MCH RBC QN AUTO: 32.7 PG (ref 26.6–33)
MCHC RBC AUTO-ENTMCNC: 35.2 G/DL (ref 31.5–35.7)
MCV RBC AUTO: 92.9 FL (ref 79–97)
PLATELET # BLD AUTO: 161 10*3/MM3 (ref 140–450)
PMV BLD AUTO: 11.3 FL (ref 6–12)
RBC # BLD AUTO: 4.53 10*6/MM3 (ref 3.77–5.28)
RH BLD: POSITIVE
RH BLD: POSITIVE
T&S EXPIRATION DATE: NORMAL
TREPONEMA PALLIDUM IGG+IGM AB [PRESENCE] IN SERUM OR PLASMA BY IMMUNOASSAY: NORMAL
URATE SERPL-MCNC: 4.4 MG/DL (ref 2.4–5.7)
WBC NRBC COR # BLD AUTO: 10.81 10*3/MM3 (ref 3.4–10.8)

## 2025-02-13 PROCEDURE — 84460 ALANINE AMINO (ALT) (SGPT): CPT | Performed by: OBSTETRICS & GYNECOLOGY

## 2025-02-13 PROCEDURE — 25010000002 LIDOCAINE-EPINEPHRINE (PF) 1.5 %-1:200000 SOLUTION: Performed by: ANESTHESIOLOGY

## 2025-02-13 PROCEDURE — 83615 LACTATE (LD) (LDH) ENZYME: CPT | Performed by: OBSTETRICS & GYNECOLOGY

## 2025-02-13 PROCEDURE — 84450 TRANSFERASE (AST) (SGOT): CPT | Performed by: OBSTETRICS & GYNECOLOGY

## 2025-02-13 PROCEDURE — 25010000002 FENTANYL CITRATE (PF) 50 MCG/ML SOLUTION: Performed by: ANESTHESIOLOGY

## 2025-02-13 PROCEDURE — 82565 ASSAY OF CREATININE: CPT | Performed by: OBSTETRICS & GYNECOLOGY

## 2025-02-13 PROCEDURE — 86850 RBC ANTIBODY SCREEN: CPT | Performed by: OBSTETRICS & GYNECOLOGY

## 2025-02-13 PROCEDURE — 86901 BLOOD TYPING SEROLOGIC RH(D): CPT

## 2025-02-13 PROCEDURE — 25010000002 ONDANSETRON PER 1 MG: Performed by: ANESTHESIOLOGY

## 2025-02-13 PROCEDURE — 84550 ASSAY OF BLOOD/URIC ACID: CPT | Performed by: OBSTETRICS & GYNECOLOGY

## 2025-02-13 PROCEDURE — 85027 COMPLETE CBC AUTOMATED: CPT | Performed by: OBSTETRICS & GYNECOLOGY

## 2025-02-13 PROCEDURE — 25810000003 SODIUM CHLORIDE 0.9 % SOLUTION: Performed by: ANESTHESIOLOGY

## 2025-02-13 PROCEDURE — 86780 TREPONEMA PALLIDUM: CPT | Performed by: OBSTETRICS & GYNECOLOGY

## 2025-02-13 PROCEDURE — C1755 CATHETER, INTRASPINAL: HCPCS | Performed by: ANESTHESIOLOGY

## 2025-02-13 PROCEDURE — 86901 BLOOD TYPING SEROLOGIC RH(D): CPT | Performed by: OBSTETRICS & GYNECOLOGY

## 2025-02-13 PROCEDURE — 86900 BLOOD TYPING SEROLOGIC ABO: CPT

## 2025-02-13 PROCEDURE — 25010000002 BUPIVACAINE (PF) 0.25 % SOLUTION: Performed by: ANESTHESIOLOGY

## 2025-02-13 PROCEDURE — 82247 BILIRUBIN TOTAL: CPT | Performed by: OBSTETRICS & GYNECOLOGY

## 2025-02-13 PROCEDURE — 84075 ASSAY ALKALINE PHOSPHATASE: CPT | Performed by: OBSTETRICS & GYNECOLOGY

## 2025-02-13 PROCEDURE — 25010000002 ROPIVACAINE PER 1 MG: Performed by: ANESTHESIOLOGY

## 2025-02-13 PROCEDURE — 25810000003 LACTATED RINGERS PER 1000 ML: Performed by: OBSTETRICS & GYNECOLOGY

## 2025-02-13 PROCEDURE — 86900 BLOOD TYPING SEROLOGIC ABO: CPT | Performed by: OBSTETRICS & GYNECOLOGY

## 2025-02-13 RX ORDER — FENTANYL CITRATE 50 UG/ML
50 INJECTION, SOLUTION INTRAMUSCULAR; INTRAVENOUS
Status: CANCELLED | OUTPATIENT
Start: 2025-02-13 | End: 2025-02-18

## 2025-02-13 RX ORDER — ONDANSETRON 2 MG/ML
4 INJECTION INTRAMUSCULAR; INTRAVENOUS ONCE AS NEEDED
Status: COMPLETED | OUTPATIENT
Start: 2025-02-13 | End: 2025-02-13

## 2025-02-13 RX ORDER — MISOPROSTOL 200 UG/1
800 TABLET ORAL AS NEEDED
Status: CANCELLED | OUTPATIENT
Start: 2025-02-13

## 2025-02-13 RX ORDER — LIDOCAINE HYDROCHLORIDE AND EPINEPHRINE 15; 5 MG/ML; UG/ML
INJECTION, SOLUTION EPIDURAL AS NEEDED
Status: DISCONTINUED | OUTPATIENT
Start: 2025-02-13 | End: 2025-02-14 | Stop reason: SURG

## 2025-02-13 RX ORDER — SODIUM CHLORIDE 9 MG/ML
40 INJECTION, SOLUTION INTRAVENOUS AS NEEDED
Status: CANCELLED | OUTPATIENT
Start: 2025-02-13

## 2025-02-13 RX ORDER — SODIUM CHLORIDE 0.9 % (FLUSH) 0.9 %
10 SYRINGE (ML) INJECTION AS NEEDED
Status: CANCELLED | OUTPATIENT
Start: 2025-02-13

## 2025-02-13 RX ORDER — FENTANYL CITRATE 50 UG/ML
INJECTION, SOLUTION INTRAMUSCULAR; INTRAVENOUS AS NEEDED
Status: DISCONTINUED | OUTPATIENT
Start: 2025-02-13 | End: 2025-02-14 | Stop reason: SURG

## 2025-02-13 RX ORDER — CITRIC ACID/SODIUM CITRATE 334-500MG
30 SOLUTION, ORAL ORAL ONCE
Status: DISCONTINUED | OUTPATIENT
Start: 2025-02-13 | End: 2025-02-14 | Stop reason: HOSPADM

## 2025-02-13 RX ORDER — METOCLOPRAMIDE HYDROCHLORIDE 5 MG/ML
10 INJECTION INTRAMUSCULAR; INTRAVENOUS ONCE AS NEEDED
Status: DISCONTINUED | OUTPATIENT
Start: 2025-02-13 | End: 2025-02-14 | Stop reason: HOSPADM

## 2025-02-13 RX ORDER — LIDOCAINE HYDROCHLORIDE 10 MG/ML
0.5 INJECTION, SOLUTION EPIDURAL; INFILTRATION; INTRACAUDAL; PERINEURAL ONCE AS NEEDED
Status: CANCELLED | OUTPATIENT
Start: 2025-02-13

## 2025-02-13 RX ORDER — ONDANSETRON 2 MG/ML
4 INJECTION INTRAMUSCULAR; INTRAVENOUS EVERY 6 HOURS PRN
Status: CANCELLED | OUTPATIENT
Start: 2025-02-13

## 2025-02-13 RX ORDER — OXYTOCIN/0.9 % SODIUM CHLORIDE 30/500 ML
999 PLASTIC BAG, INJECTION (ML) INTRAVENOUS ONCE
Status: CANCELLED | OUTPATIENT
Start: 2025-02-13 | End: 2025-02-13

## 2025-02-13 RX ORDER — SODIUM CHLORIDE 0.9 % (FLUSH) 0.9 %
10 SYRINGE (ML) INJECTION EVERY 12 HOURS SCHEDULED
Status: CANCELLED | OUTPATIENT
Start: 2025-02-13

## 2025-02-13 RX ORDER — CARBOPROST TROMETHAMINE 250 UG/ML
250 INJECTION, SOLUTION INTRAMUSCULAR AS NEEDED
Status: CANCELLED | OUTPATIENT
Start: 2025-02-13

## 2025-02-13 RX ORDER — FAMOTIDINE 10 MG/ML
20 INJECTION, SOLUTION INTRAVENOUS ONCE AS NEEDED
Status: DISCONTINUED | OUTPATIENT
Start: 2025-02-13 | End: 2025-02-14 | Stop reason: HOSPADM

## 2025-02-13 RX ORDER — ONDANSETRON 4 MG/1
4 TABLET, ORALLY DISINTEGRATING ORAL EVERY 6 HOURS PRN
Status: CANCELLED | OUTPATIENT
Start: 2025-02-13

## 2025-02-13 RX ORDER — ACETAMINOPHEN 325 MG/1
650 TABLET ORAL EVERY 4 HOURS PRN
Status: CANCELLED | OUTPATIENT
Start: 2025-02-13

## 2025-02-13 RX ORDER — SODIUM CHLORIDE, SODIUM LACTATE, POTASSIUM CHLORIDE, CALCIUM CHLORIDE 600; 310; 30; 20 MG/100ML; MG/100ML; MG/100ML; MG/100ML
125 INJECTION, SOLUTION INTRAVENOUS CONTINUOUS
Status: ACTIVE | OUTPATIENT
Start: 2025-02-13 | End: 2025-02-13

## 2025-02-13 RX ORDER — EPHEDRINE SULFATE 5 MG/ML
10 INJECTION INTRAVENOUS
Status: DISCONTINUED | OUTPATIENT
Start: 2025-02-13 | End: 2025-02-14 | Stop reason: HOSPADM

## 2025-02-13 RX ORDER — BUPIVACAINE HYDROCHLORIDE 2.5 MG/ML
INJECTION, SOLUTION EPIDURAL; INFILTRATION; INTRACAUDAL AS NEEDED
Status: DISCONTINUED | OUTPATIENT
Start: 2025-02-13 | End: 2025-02-14 | Stop reason: SURG

## 2025-02-13 RX ORDER — FENTANYL CITRATE 50 UG/ML
100 INJECTION, SOLUTION INTRAMUSCULAR; INTRAVENOUS
Status: CANCELLED | OUTPATIENT
Start: 2025-02-13 | End: 2025-02-18

## 2025-02-13 RX ORDER — DIPHENHYDRAMINE HYDROCHLORIDE 50 MG/ML
12.5 INJECTION INTRAMUSCULAR; INTRAVENOUS EVERY 8 HOURS PRN
Status: DISCONTINUED | OUTPATIENT
Start: 2025-02-13 | End: 2025-02-14 | Stop reason: HOSPADM

## 2025-02-13 RX ORDER — METHYLERGONOVINE MALEATE 0.2 MG/ML
200 INJECTION INTRAVENOUS ONCE AS NEEDED
Status: CANCELLED | OUTPATIENT
Start: 2025-02-13

## 2025-02-13 RX ORDER — SODIUM CHLORIDE, SODIUM LACTATE, POTASSIUM CHLORIDE, CALCIUM CHLORIDE 600; 310; 30; 20 MG/100ML; MG/100ML; MG/100ML; MG/100ML
125 INJECTION, SOLUTION INTRAVENOUS CONTINUOUS
Status: CANCELLED | OUTPATIENT
Start: 2025-02-13 | End: 2025-02-15

## 2025-02-13 RX ORDER — OXYTOCIN/0.9 % SODIUM CHLORIDE 30/500 ML
250 PLASTIC BAG, INJECTION (ML) INTRAVENOUS CONTINUOUS
Status: CANCELLED | OUTPATIENT
Start: 2025-02-13 | End: 2025-02-13

## 2025-02-13 RX ORDER — MAGNESIUM CARB/ALUMINUM HYDROX 105-160MG
30 TABLET,CHEWABLE ORAL ONCE
Status: CANCELLED | OUTPATIENT
Start: 2025-02-13 | End: 2025-02-13

## 2025-02-13 RX ADMIN — FENTANYL CITRATE 100 MCG: 50 INJECTION, SOLUTION INTRAMUSCULAR; INTRAVENOUS at 20:23

## 2025-02-13 RX ADMIN — SODIUM CHLORIDE, SODIUM LACTATE, POTASSIUM CHLORIDE, CALCIUM CHLORIDE 125 ML/HR: 20; 30; 600; 310 INJECTION, SOLUTION INTRAVENOUS at 19:52

## 2025-02-13 RX ADMIN — EPHEDRINE SULFATE 10 MG: 5 INJECTION INTRAVENOUS at 23:02

## 2025-02-13 RX ADMIN — LIDOCAINE HYDROCHLORIDE AND EPINEPHRINE 2 ML: 15; 5 INJECTION, SOLUTION EPIDURAL at 20:22

## 2025-02-13 RX ADMIN — ONDANSETRON 4 MG: 2 INJECTION INTRAMUSCULAR; INTRAVENOUS at 22:14

## 2025-02-13 RX ADMIN — BUPIVACAINE HYDROCHLORIDE 8 ML: 2.5 INJECTION, SOLUTION EPIDURAL; INFILTRATION; INTRACAUDAL; PERINEURAL at 20:23

## 2025-02-13 RX ADMIN — LIDOCAINE HYDROCHLORIDE AND EPINEPHRINE 3 ML: 15; 5 INJECTION, SOLUTION EPIDURAL at 20:21

## 2025-02-13 RX ADMIN — SODIUM CHLORIDE, SODIUM LACTATE, POTASSIUM CHLORIDE, CALCIUM CHLORIDE 125 ML/HR: 20; 30; 600; 310 INJECTION, SOLUTION INTRAVENOUS at 19:22

## 2025-02-14 PROBLEM — Z34.90 CURRENTLY PREGNANT: Status: ACTIVE | Noted: 2025-02-14

## 2025-02-14 PROBLEM — Z37.9 NORMAL LABOR: Status: ACTIVE | Noted: 2025-02-14

## 2025-02-14 PROBLEM — Z3A.39 39 WEEKS GESTATION OF PREGNANCY: Status: ACTIVE | Noted: 2025-02-14

## 2025-02-14 PROCEDURE — 25810000003 LACTATED RINGERS PER 1000 ML: Performed by: OBSTETRICS & GYNECOLOGY

## 2025-02-14 RX ORDER — OXYTOCIN/0.9 % SODIUM CHLORIDE 30/500 ML
999 PLASTIC BAG, INJECTION (ML) INTRAVENOUS ONCE
Status: DISCONTINUED | OUTPATIENT
Start: 2025-02-14 | End: 2025-02-14 | Stop reason: HOSPADM

## 2025-02-14 RX ORDER — HYDROCORTISONE 25 MG/G
1 CREAM TOPICAL AS NEEDED
Status: DISCONTINUED | OUTPATIENT
Start: 2025-02-14 | End: 2025-02-16 | Stop reason: HOSPADM

## 2025-02-14 RX ORDER — IBUPROFEN 600 MG/1
600 TABLET, FILM COATED ORAL EVERY 6 HOURS PRN
Status: DISCONTINUED | OUTPATIENT
Start: 2025-02-14 | End: 2025-02-16 | Stop reason: HOSPADM

## 2025-02-14 RX ORDER — SODIUM CHLORIDE, SODIUM LACTATE, POTASSIUM CHLORIDE, CALCIUM CHLORIDE 600; 310; 30; 20 MG/100ML; MG/100ML; MG/100ML; MG/100ML
125 INJECTION, SOLUTION INTRAVENOUS CONTINUOUS
Status: DISCONTINUED | OUTPATIENT
Start: 2025-02-14 | End: 2025-02-14

## 2025-02-14 RX ORDER — METHYLERGONOVINE MALEATE 0.2 MG/ML
200 INJECTION INTRAVENOUS ONCE AS NEEDED
Status: DISCONTINUED | OUTPATIENT
Start: 2025-02-14 | End: 2025-02-14 | Stop reason: HOSPADM

## 2025-02-14 RX ORDER — HYDROCODONE BITARTRATE AND ACETAMINOPHEN 5; 325 MG/1; MG/1
1 TABLET ORAL EVERY 4 HOURS PRN
Status: DISCONTINUED | OUTPATIENT
Start: 2025-02-14 | End: 2025-02-16 | Stop reason: HOSPADM

## 2025-02-14 RX ORDER — OXYCODONE AND ACETAMINOPHEN 7.5; 325 MG/1; MG/1
2 TABLET ORAL EVERY 4 HOURS PRN
Status: DISCONTINUED | OUTPATIENT
Start: 2025-02-14 | End: 2025-02-14 | Stop reason: HOSPADM

## 2025-02-14 RX ORDER — ACETAMINOPHEN 325 MG/1
650 TABLET ORAL EVERY 4 HOURS PRN
Status: DISCONTINUED | OUTPATIENT
Start: 2025-02-14 | End: 2025-02-14 | Stop reason: HOSPADM

## 2025-02-14 RX ORDER — OXYTOCIN/0.9 % SODIUM CHLORIDE 30/500 ML
125 PLASTIC BAG, INJECTION (ML) INTRAVENOUS ONCE AS NEEDED
Status: DISCONTINUED | OUTPATIENT
Start: 2025-02-14 | End: 2025-02-16 | Stop reason: HOSPADM

## 2025-02-14 RX ORDER — PROMETHAZINE HYDROCHLORIDE 25 MG/1
25 TABLET ORAL EVERY 6 HOURS PRN
Status: DISCONTINUED | OUTPATIENT
Start: 2025-02-14 | End: 2025-02-16 | Stop reason: HOSPADM

## 2025-02-14 RX ORDER — DOCUSATE SODIUM 100 MG/1
100 CAPSULE, LIQUID FILLED ORAL 2 TIMES DAILY
Status: DISCONTINUED | OUTPATIENT
Start: 2025-02-14 | End: 2025-02-16 | Stop reason: HOSPADM

## 2025-02-14 RX ORDER — BISACODYL 10 MG
10 SUPPOSITORY, RECTAL RECTAL DAILY PRN
Status: DISCONTINUED | OUTPATIENT
Start: 2025-02-15 | End: 2025-02-16 | Stop reason: HOSPADM

## 2025-02-14 RX ORDER — PROMETHAZINE HYDROCHLORIDE 12.5 MG/1
12.5 TABLET ORAL EVERY 6 HOURS PRN
Status: DISCONTINUED | OUTPATIENT
Start: 2025-02-14 | End: 2025-02-14 | Stop reason: HOSPADM

## 2025-02-14 RX ORDER — HYDROCODONE BITARTRATE AND ACETAMINOPHEN 10; 325 MG/1; MG/1
1 TABLET ORAL EVERY 4 HOURS PRN
Status: DISCONTINUED | OUTPATIENT
Start: 2025-02-14 | End: 2025-02-16 | Stop reason: HOSPADM

## 2025-02-14 RX ORDER — OXYTOCIN/0.9 % SODIUM CHLORIDE 30/500 ML
250 PLASTIC BAG, INJECTION (ML) INTRAVENOUS CONTINUOUS
Status: ACTIVE | OUTPATIENT
Start: 2025-02-14 | End: 2025-02-14

## 2025-02-14 RX ORDER — OXYTOCIN/0.9 % SODIUM CHLORIDE 30/500 ML
PLASTIC BAG, INJECTION (ML) INTRAVENOUS
Status: COMPLETED
Start: 2025-02-14 | End: 2025-02-14

## 2025-02-14 RX ORDER — ACETAMINOPHEN 325 MG/1
650 TABLET ORAL EVERY 6 HOURS PRN
Status: DISCONTINUED | OUTPATIENT
Start: 2025-02-14 | End: 2025-02-16 | Stop reason: HOSPADM

## 2025-02-14 RX ORDER — OXYTOCIN/0.9 % SODIUM CHLORIDE 30/500 ML
2-20 PLASTIC BAG, INJECTION (ML) INTRAVENOUS
Status: DISCONTINUED | OUTPATIENT
Start: 2025-02-14 | End: 2025-02-14

## 2025-02-14 RX ORDER — MORPHINE SULFATE 2 MG/ML
2 INJECTION, SOLUTION INTRAMUSCULAR; INTRAVENOUS
Status: DISCONTINUED | OUTPATIENT
Start: 2025-02-14 | End: 2025-02-14 | Stop reason: HOSPADM

## 2025-02-14 RX ORDER — PROMETHAZINE HYDROCHLORIDE 12.5 MG/1
12.5 SUPPOSITORY RECTAL EVERY 6 HOURS PRN
Status: DISCONTINUED | OUTPATIENT
Start: 2025-02-14 | End: 2025-02-14 | Stop reason: HOSPADM

## 2025-02-14 RX ADMIN — Medication: at 07:53

## 2025-02-14 RX ADMIN — Medication 2 MILLI-UNITS/MIN: at 03:07

## 2025-02-14 RX ADMIN — DOCUSATE SODIUM 100 MG: 100 CAPSULE, LIQUID FILLED ORAL at 20:14

## 2025-02-14 RX ADMIN — SODIUM CHLORIDE, SODIUM LACTATE, POTASSIUM CHLORIDE, CALCIUM CHLORIDE 125 ML/HR: 20; 30; 600; 310 INJECTION, SOLUTION INTRAVENOUS at 00:32

## 2025-02-14 RX ADMIN — WITCH HAZEL 1 PAD: 500 SOLUTION RECTAL; TOPICAL at 07:53

## 2025-02-14 RX ADMIN — Medication 1 APPLICATION: at 07:53

## 2025-02-14 RX ADMIN — ACETAMINOPHEN 650 MG: 325 TABLET ORAL at 09:04

## 2025-02-14 RX ADMIN — ACETAMINOPHEN 650 MG: 325 TABLET ORAL at 16:47

## 2025-02-14 RX ADMIN — EPHEDRINE SULFATE 10 MG: 5 INJECTION INTRAVENOUS at 00:25

## 2025-02-14 RX ADMIN — IBUPROFEN 600 MG: 600 TABLET, FILM COATED ORAL at 12:09

## 2025-02-14 RX ADMIN — IBUPROFEN 600 MG: 600 TABLET, FILM COATED ORAL at 20:14

## 2025-02-14 RX ADMIN — DOCUSATE SODIUM 100 MG: 100 CAPSULE, LIQUID FILLED ORAL at 09:06

## 2025-02-14 RX ADMIN — ACETAMINOPHEN 650 MG: 325 TABLET ORAL at 22:43

## 2025-02-14 NOTE — ANESTHESIA PREPROCEDURE EVALUATION
Anesthesia Evaluation     Patient summary reviewed and Nursing notes reviewed                Airway   Mallampati: II  TM distance: >3 FB  Neck ROM: full  No difficulty expected  Dental      Pulmonary - negative pulmonary ROS   Cardiovascular - negative cardio ROS        Neuro/Psych- negative ROS  GI/Hepatic/Renal/Endo - negative ROS     Musculoskeletal (-) negative ROS    Abdominal    Substance History - negative use     OB/GYN    (+) Pregnant        Other                    Anesthesia Plan    ASA 2     epidural       Anesthetic plan, risks, benefits, and alternatives have been provided, discussed and informed consent has been obtained with: patient.    Use of blood products discussed with patient .      CODE STATUS:

## 2025-02-14 NOTE — H&P
KODY Monge  Obstetric History and Physical    No chief complaint on file.      Subjective     Patient is a 30 y.o. female  currently at 39w5d, who presents with labor at term..    Her prenatal care is benign.  Her previous obstetric/gynecological history is noted for is non-contributory.    The following portions of the patients history were reviewed and updated as appropriate: current medications .       Prenatal Information:  Prenatal Results       Initial Prenatal Labs       Test Value Reference Range Date Time    Hemoglobin  13.3 g/dL 11.1 - 15.9 24 1456    Hematocrit  40.9 % 34.0 - 46.6 24 1456    Platelets  244 x10E3/uL 150 - 450 24 1456    Rubella IgG  <0.90 index Immune >0.99 24 1456    Hepatitis B SAg  Negative  Negative 24 1456    Hepatitis C Ab  Non Reactive  Non Reactive 24 1456    RPR  Non Reactive  Non Reactive 24 1016       Non Reactive  Non Reactive 24 1456    T. Pallidum Ab   Non-Reactive  Non-Reactive 25 193    ABO  O   25    Rh  Positive   25    Antibody Screen  Negative  Negative 24 1456    HIV  Non Reactive  Non Reactive 24 1456    Urine Culture  Final report   24 1456    Gonorrhea  Negative  Negative 01/10/25        Negative  Negative 24 1456    Chlamydia  Negative  Negative 01/10/25        Negative  Negative 24 1456    TSH        HgB A1c         Varicella IgG        Hemoglobinopathy Fractionation        Hemoglobinopathy (genetic testing)        Cystic fibrosis         Spinal muscular atrophy        Fragile X                  Fetal testing        Test Value Reference Range Date Time    NIPT        MSAFP  *Screen Negative*   09/10/24 1644    AFP-4                  2nd and 3rd Trimester       Test Value Reference Range Date Time    Hemoglobin (repeated)  14.8 g/dL 12.0 - 15.9 25 1930       12.6 g/dL 12.0 - 15.9 24 1016    Hematocrit (repeated)  42.1 % 34.0 - 46.6 25  1930       37.6 % 34.0 - 46.6 12/06/24 1016    Platelets   161 10*3/mm3 140 - 450 02/13/25 1930       211 10*3/mm3 140 - 450 12/06/24 1016       244 x10E3/uL 150 - 450 07/11/24 1456    1 hour GTT   106 mg/dL 65 - 139 12/06/24 1016    Antibody Screen (repeated)  Negative   02/13/25 1930       Negative  Negative 12/06/24 1016    3rd TM syphilis scrn (repeated)  RPR   Non Reactive  Non Reactive 12/06/24 1016    3rd TM syphilis scrn (repeated) TP-Ab  Non-Reactive  Non-Reactive 02/13/25 1930    3rd TM syphilis screen TB-Ab (FTA)  Non-Reactive  Non-Reactive 02/13/25 1930    Syphilis cascade test TP-Ab (EIA)        Syphilis cascade TPPA        GTT Fasting        GTT 1 Hr        GTT 2 Hr        GTT 3 Hr        Group B Strep  No Group B Streptococcus isolated   01/24/25 1824              Other testing        Test Value Reference Range Date Time    Parvo IgG         CMV IgG                   Drug Screening       Test Value Reference Range Date Time    Amphetamine Screen  Negative ng/mL Ijpyay=8139 07/11/24 1456    Barbiturate Screen  Negative ng/mL Yjjlkq=071 07/11/24 1456    Benzodiazepine Screen  Negative ng/mL Zlitje=876 07/11/24 1456    Methadone Screen  Negative ng/mL Mqekrn=162 07/11/24 1456    Phencyclidine Screen  Negative ng/mL Cutoff=25 07/11/24 1456    Opiates Screen  Negative ng/mL Eohusg=407 07/11/24 1456    THC Screen  Negative ng/mL Cutoff=20 07/11/24 1456    Cocaine Screen  Negative ng/mL Nzykej=482 07/11/24 1456    Propoxyphene Screen  Negative ng/mL Hhpdla=895 07/11/24 1456    Buprenorphine Screen        Methamphetamine Screen        Oxycodone Screen        Tricyclic Antidepressants Screen                  Legend    ^: Historical                          External Prenatal Results       Pregnancy Outside Results - Transcribed From Office Records - See Scanned Records For Details       Test Value Date Time    ABO  O  02/13/25 2050    Rh  Positive  02/13/25 2050    Antibody Screen  Negative  02/13/25 1930        Negative  12/06/24 1016       Negative  07/11/24 1456    Varicella IgG       Rubella  <0.90 index 07/11/24 1456    Hgb  14.8 g/dL 02/13/25 1930       12.6 g/dL 12/06/24 1016       13.3 g/dL 07/11/24 1456    Hct  42.1 % 02/13/25 1930       37.6 % 12/06/24 1016       40.9 % 07/11/24 1456    HgB A1c        1h GTT  106 mg/dL 12/06/24 1016    3h GTT Fasting       3h GTT 1 hour       3h GTT 2 hour       3h GTT 3 hour        Gonorrhea (discrete)  Negative  01/10/25        Negative  07/11/24 1456    Chlamydia (discrete)  Negative  01/10/25        Negative  07/11/24 1456    RPR  Non Reactive  12/06/24 1016       Non Reactive  07/11/24 1456    Syphils cascade: TP-Ab (FTA)  Non-Reactive  02/13/25 1930    TP-Ab  Non-Reactive  02/13/25 1930    TP-Ab (EIA)       TPPA       HBsAg  Negative  07/11/24 1456    Herpes Simplex Virus PCR       Herpes Simplex VIrus Culture       HIV  Non Reactive  07/11/24 1456    Hep C RNA Quant PCR       Hep C Antibody  Non Reactive  07/11/24 1456    AFP  46.6 ng/mL 09/10/24 1644    NIPT       Cystic Fibrosis (Brianda)       Cystic Fibroisis        Spinal Muscular atrophy       Fragile X       Group B Strep  No Group B Streptococcus isolated  01/24/25 1824    GBS Susceptibility to Clindamycin       GBS Susceptibility to Erythromycin       Fetal Fibronectin       Genetic Testing, Maternal Blood                 Drug Screening       Test Value Date Time    Urine Drug Screen       Amphetamine Screen  Negative ng/mL 07/11/24 1456    Barbiturate Screen  Negative ng/mL 07/11/24 1456    Benzodiazepine Screen  Negative ng/mL 07/11/24 1456    Methadone Screen  Negative ng/mL 07/11/24 1456    Phencyclidine Screen  Negative ng/mL 07/11/24 1456    Opiates Screen       THC Screen       Cocaine Screen       Propoxyphene Screen  Negative ng/mL 07/11/24 1456    Buprenorphine Screen       Methamphetamine Screen       Oxycodone Screen       Tricyclic Antidepressants Screen                 Legend    ^: Historical                              Past OB History:     OB History    Para Term  AB Living   1 0 0 0 0 0   SAB IAB Ectopic Molar Multiple Live Births   0 0 0 0 0 0      # Outcome Date GA Lbr Ashutosh/2nd Weight Sex Type Anes PTL Lv   1 Current                Past Medical History: Past Medical History:   Diagnosis Date    Abnormal Pap smear of cervix     HPV (human papilloma virus) infection 2018    S/p colposcopy and leep, All normal paps since that time    Pregnancy 2024      Past Surgical History Past Surgical History:   Procedure Laterality Date    APPENDECTOMY      CERVICAL BIOPSY  W/ LOOP ELECTRODE EXCISION  2018    WISDOM TOOTH EXTRACTION        Family History: Family History   Problem Relation Age of Onset    Hyperlipidemia Father     No Known Problems Mother     Breast cancer Maternal Grandmother         Diagnosed in lates 70s-80s      Social History:  reports that she has never smoked. She has never been exposed to tobacco smoke. She has never used smokeless tobacco.   reports that she does not currently use alcohol.   reports no history of drug use.        General ROS: Pertinent items are noted in HPI    Objective       Vital Signs Range for the last 24 hours  Temperature: Temp:  [97.8 °F (36.6 °C)] 97.8 °F (36.6 °C)   Temp Source: Temp src: Oral   BP: BP: ()/(56-83) 108/70   Pulse: Heart Rate:  [] 107   Respirations: Resp:  [18] 18   SPO2:     O2 Amount (l/min):     O2 Devices     Weight: Weight:  [80.7 kg (178 lb)] 80.7 kg (178 lb)     Physical Examination: General appearance - alert, well appearing, and in no distress    Presentation: vtx   Cervix: Exam by:     Dilation:     Effacement:     Station:         Fetal Heart Rate Assessment   Method:     Beats/min:     Baseline:     Varibility:     Accels:     Decels:     Tracing Category:       Uterine Assessment   Method:     Frequency (min):     Ctx Count in 10 min:     Duration:     Intensity:     Intensity by IUPC:     Resting Tone:    "  Resting Tone by IU:     Neto Units:       Laboratory Results:   Radiology Review:   Other Studies:     Assessment & Plan       Normal labor    39 weeks gestation of pregnancy        Assessment:  1.  Intrauterine pregnancy at 39w5d weeks gestation with reactive fetal status.    2.  Labor  3.  Obstetrical history significant for is non-contributory.  4.  GBS status: No results found for: \"GBSANTIGEN\"    Plan:  1.  Expect vaginal delivery.  2. Plan of care has been reviewed with patient and family  3.  Risks, benefits of treatment plan have been discussed.  4.  All questions have been answered.  5.        Hannah Noguera MD  2/14/2025  02:08 EST  "

## 2025-02-14 NOTE — ANESTHESIA PROCEDURE NOTES
Labor Epidural      Patient reassessed immediately prior to procedure    Patient location during procedure: OB  Performed By  Anesthesiologist: Jose Mancini DO  Preanesthetic Checklist  Completed: patient identified, IV checked, site marked, risks and benefits discussed, surgical consent, monitors and equipment checked, pre-op evaluation and timeout performed  Prep:  Pt Position:sitting  Sterile Tech:gloves, mask, sterile barrier and cap  Prep:chlorhexidine gluconate and isopropyl alcohol  Monitoring:blood pressure monitoring and continuous pulse oximetry  Epidural Block Procedure:  Approach:midline  Guidance:landmark technique and palpation technique  Location:L3-L4  Needle Type:Tuohy  Needle Gauge:17 G  Loss of Resistance Medium: air  Loss of Resistance: 5cm  Cath Depth at skin:11 cm  Paresthesia: none  Aspiration:negative  Test Dose:negative  Number of Attempts: 1  Post Assessment:  Dressing:secured with tape and occlusive dressing applied (Tegaderm Placed)  Pt Tolerance:patient tolerated the procedure well with no apparent complications  Complications:no

## 2025-02-14 NOTE — ANESTHESIA POSTPROCEDURE EVALUATION
Patient: Ariane Vasquez    Procedure Summary       Date: 02/13/25 Room / Location:     Anesthesia Start: 2012 Anesthesia Stop: 02/14/25 0433    Procedure: LABOR ANALGESIA Diagnosis:     Scheduled Providers:  Provider: Jose Mancini DO    Anesthesia Type: epidural ASA Status: 2            Anesthesia Type: epidural    Vitals  Vitals Value Taken Time   /72 02/14/25 0635   Temp 97.9 °F (36.6 °C) 02/14/25 0635   Pulse 72 02/14/25 0635   Resp 16 02/14/25 0635   SpO2             Post Anesthesia Care and Evaluation    Patient location during evaluation: bedside  Patient participation: complete - patient participated  Level of consciousness: awake and awake and alert  Pain score: 0  Pain management: satisfactory to patient    Airway patency: patent  Anesthetic complications: No anesthetic complications  PONV Status: none  Cardiovascular status: acceptable, hemodynamically stable and stable  Respiratory status: acceptable  Hydration status: stable  Post Neuraxial Block status: Motor and sensory function returned to baseline and No signs or symptoms of PDPH

## 2025-02-14 NOTE — L&D DELIVERY NOTE
[place] [place]    Patient Name: Ariane Vasquez  : 1995  MRN: [id number]    Date of Delivery: 2025    Diagnosis     Pre & Post-Delivery:  Intrauterine pregnancy at 39w5d  Labor status: Spontaneous Onset of Labor    Normal labor    39 weeks gestation of pregnancy             Problem List    Transfer to Postpartum     Review the Delivery Report for details.     Delivery     Delivery:      YOB: 2027   Time of Birth:  Gestational Age 4:26 AM  39w5d     Anesthesia: Epidural    Delivering clinician: Hannah Noguera   Forceps?   No   Vacuum? No    Shoulder dystocia present: No        Delivery narrative: Patient pushed for 3 hours.  Vertex was transverse but rotated to occiput anterior and then progress began.  Patient crowned and delivered over midline episiotomy under epidural a female.  Loose nuchal cord released off the perineum baby delivered crying Cord milked cord cut and clamped baby handed off to nurses crying.  Cord blood obtained placenta expressed uterus explored.  A midline episiotomy which was about 5 cm long was closed with 2-0 chromic running lock stitch.        Infant     Findings: female infant     Infant observations: Weight: 3990 g (8 lb 12.7 oz)  Length: 19 in  Observations/Comments:        Apgars: 8  @ 1 minute /    9  @ 5 minutes   Infant Name:      Placenta & Cord         Placenta delivered  Spontaneous at   2025  4:33 AM    Cord:   present.   Nuchal Cord?  yes; Number of nuchal loops present:      Cord blood obtained: Yes   Cord gases obtained:      Cord gas results: Venous:  No results found for: \"PHCVEN\", \"BECVEN\"    Arterial:  No results found for: \"PHCART\", \"BECART\"     Repair     Episiotomy: Median    Yes  Suture used for repair: 2-0 chromic gut    Lacerations: No   Estimated Blood Loss: Est. Blood Loss (mL): 150 mL (Filed from Delivery Summary) (25 0086)     Quantitative Blood Loss:  150        Complications "     none    Disposition     Mother to Mother Baby/Postpartum  in stable condition currently.  Baby to NBN  in stable condition currently.    Hannah Noguera MD  02/14/25  04:54 EST

## 2025-02-14 NOTE — PAYOR COMM NOTE
"Lukas Vasquez (30 y.o. Female)     FROM: Chyna Leonard LPN, Utilization Review  Phone #[phone]  Fax #[phone]     ID# [id number]  ADDRESS:  [address] [place] [address]  PHONE #[phone]    FACILITY:   [facility]  NPI#[id number]  TAX ID#[id number]  [address] [place] [address]  PHONE #[phone]    PHYSICIAN:  DR JUANJO PRINCE  NPI#[id number]  [address] [facility] [address]  [place] [address]  PHONE #[phone]    DIAGNOSIS CODE:  O80  VAGINAL DELIVERY          Date of Birth   1995    Social Security Number       Address   [address] [place] [address]    Home Phone   [phone]    MRN   [id number]       Faith   Islam    Marital Status                               Admission Date   2/13/25    Admission Type   Elective    Admitting Provider   Hannah Noguera MD    Attending Provider   Juanjo Prince MD    Department, Room/Bed   [facility] 4A, N420/1       Discharge Date       Discharge Disposition       Discharge Destination                                 Attending Provider: Juanjo Prince MD    Allergies: Sulfa Antibiotics    Isolation: None   Infection: None   Code Status: CPR    Ht: 170.2 cm (67\")   Wt: 80.7 kg (178 lb)    Admission Cmt: None   Principal Problem: Currently pregnant [Z34.90]                   Active Insurance as of 2/13/2025       Primary Coverage       Payor Plan Insurance Group Employer/Plan Group      PRIME        Payor Plan Address Payor Plan Phone Number Payor Plan Fax Number Effective Dates    [address]   9/13/2024 - None Entered    ATTN: NEW CLAIMS       [facility] [id number]         Subscriber Name Subscriber Birth Date Member ID       LUKAS VASQUEZ 1995 [id number]                     Emergency Contacts        (Rel.) Home Phone Work Phone Mobile Phone    WILL TANNER (Spouse) [phone] -- --      "         Insurance Information                  [employer] Phone: --    Subscriber: Ariane Vasquez Subscriber#: [id number]    Group#: -- Precert#: --    Authorization#: -- Effective Date: --             History & Physical        Hannah Noguera MD at 25 0206          [facility]  Obstetric History and Physical    No chief complaint on file.      Subjective    Patient is a 30 y.o. female  currently at 39w5d, who presents with labor at term..    Her prenatal care is benign.  Her previous obstetric/gynecological history is noted for is non-contributory.    The following portions of the patients history were reviewed and updated as appropriate: current medications .       Prenatal Information:  Prenatal Results       Initial Prenatal Labs       Test Value Reference Range Date Time    Hemoglobin  13.3 g/dL 11.1 - 15.9 24 1456    Hematocrit  40.9 % 34.0 - 46.6 24 1456    Platelets  244 x10E3/uL 150 - 450 24 1456    Rubella IgG  <0.90 index Immune >0.99 24 1456    Hepatitis B SAg  Negative  Negative 24 1456    Hepatitis C Ab  Non Reactive  Non Reactive 24 1456    RPR  Non Reactive  Non Reactive 24 1016       Non Reactive  Non Reactive 24 1456    T. Pallidum Ab   Non-Reactive  Non-Reactive 25 1930    ABO  O   25    Rh  Positive   25    Antibody Screen  Negative  Negative 24 1456    HIV  Non Reactive  Non Reactive 24 1456    Urine Culture  Final report   24 1456    Gonorrhea  Negative  Negative 01/10/25        Negative  Negative 24 1456    Chlamydia  Negative  Negative 01/10/25        Negative  Negative 24 1456    TSH        HgB A1c         Varicella IgG        Hemoglobinopathy Fractionation        Hemoglobinopathy (genetic testing)        Cystic fibrosis         Spinal muscular atrophy        Fragile X                  Fetal testing        Test Value Reference Range Date Time    NIPT         MSAFP  *Screen Negative*   09/10/24 1644    AFP-4                  2nd and 3rd Trimester       Test Value Reference Range Date Time    Hemoglobin (repeated)  14.8 g/dL 12.0 - 15.9 02/13/25 1930       12.6 g/dL 12.0 - 15.9 12/06/24 1016    Hematocrit (repeated)  42.1 % 34.0 - 46.6 02/13/25 1930       37.6 % 34.0 - 46.6 12/06/24 1016    Platelets   161 10*3/mm3 140 - 450 02/13/25 1930       211 10*3/mm3 140 - 450 12/06/24 1016       244 x10E3/uL 150 - 450 07/11/24 1456    1 hour GTT   106 mg/dL 65 - 139 12/06/24 1016    Antibody Screen (repeated)  Negative   02/13/25 1930       Negative  Negative 12/06/24 1016    3rd TM syphilis scrn (repeated)  RPR   Non Reactive  Non Reactive 12/06/24 1016    3rd TM syphilis scrn (repeated) TP-Ab  Non-Reactive  Non-Reactive 02/13/25 1930    3rd TM syphilis screen TB-Ab (FTA)  Non-Reactive  Non-Reactive 02/13/25 1930    Syphilis cascade test TP-Ab (EIA)        Syphilis cascade TPPA        GTT Fasting        GTT 1 Hr        GTT 2 Hr        GTT 3 Hr        Group B Strep  No Group B Streptococcus isolated   01/24/25 1824              Other testing        Test Value Reference Range Date Time    Parvo IgG         CMV IgG                   Drug Screening       Test Value Reference Range Date Time    Amphetamine Screen  Negative ng/mL Uvdxmr=7918 07/11/24 1456    Barbiturate Screen  Negative ng/mL Lkcjad=637 07/11/24 1456    Benzodiazepine Screen  Negative ng/mL Wlufio=421 07/11/24 1456    Methadone Screen  Negative ng/mL Uflwuh=992 07/11/24 1456    Phencyclidine Screen  Negative ng/mL Cutoff=25 07/11/24 1456    Opiates Screen  Negative ng/mL Jxkqep=304 07/11/24 1456    THC Screen  Negative ng/mL Cutoff=20 07/11/24 1456    Cocaine Screen  Negative ng/mL Qmybjc=135 07/11/24 1456    Propoxyphene Screen  Negative ng/mL Abyeuc=122 07/11/24 1456    Buprenorphine Screen        Methamphetamine Screen        Oxycodone Screen        Tricyclic Antidepressants Screen                  Legend    ^:  Historical                          External Prenatal Results       Pregnancy Outside Results - Transcribed From Office Records - See Scanned Records For Details       Test Value Date Time    ABO  O  02/13/25 2050    Rh  Positive  02/13/25 2050    Antibody Screen  Negative  02/13/25 1930       Negative  12/06/24 1016       Negative  07/11/24 1456    Varicella IgG       Rubella  <0.90 index 07/11/24 1456    Hgb  14.8 g/dL 02/13/25 1930       12.6 g/dL 12/06/24 1016       13.3 g/dL 07/11/24 1456    Hct  42.1 % 02/13/25 1930       37.6 % 12/06/24 1016       40.9 % 07/11/24 1456    HgB A1c        1h GTT  106 mg/dL 12/06/24 1016    3h GTT Fasting       3h GTT 1 hour       3h GTT 2 hour       3h GTT 3 hour        Gonorrhea (discrete)  Negative  01/10/25        Negative  07/11/24 1456    Chlamydia (discrete)  Negative  01/10/25        Negative  07/11/24 1456    RPR  Non Reactive  12/06/24 1016       Non Reactive  07/11/24 1456    Syphils cascade: TP-Ab (FTA)  Non-Reactive  02/13/25 1930    TP-Ab  Non-Reactive  02/13/25 1930    TP-Ab (EIA)       TPPA       HBsAg  Negative  07/11/24 1456    Herpes Simplex Virus PCR       Herpes Simplex VIrus Culture       HIV  Non Reactive  07/11/24 1456    Hep C RNA Quant PCR       Hep C Antibody  Non Reactive  07/11/24 1456    AFP  46.6 ng/mL 09/10/24 1644    NIPT       Cystic Fibrosis (Brianda)       Cystic Fibroisis        Spinal Muscular atrophy       Fragile X       Group B Strep  No Group B Streptococcus isolated  01/24/25 1824    GBS Susceptibility to Clindamycin       GBS Susceptibility to Erythromycin       Fetal Fibronectin       Genetic Testing, Maternal Blood                 Drug Screening       Test Value Date Time    Urine Drug Screen       Amphetamine Screen  Negative ng/mL 07/11/24 1456    Barbiturate Screen  Negative ng/mL 07/11/24 1456    Benzodiazepine Screen  Negative ng/mL 07/11/24 1456    Methadone Screen  Negative ng/mL 07/11/24 1456    Phencyclidine Screen  Negative  ng/mL 24 1456    Opiates Screen       THC Screen       Cocaine Screen       Propoxyphene Screen  Negative ng/mL 24 1456    Buprenorphine Screen       Methamphetamine Screen       Oxycodone Screen       Tricyclic Antidepressants Screen                 Legend    ^: Historical                             Past OB History:     OB History    Para Term  AB Living   1 0 0 0 0 0   SAB IAB Ectopic Molar Multiple Live Births   0 0 0 0 0 0      # Outcome Date GA Lbr Ashutosh/2nd Weight Sex Type Anes PTL Lv   1 Current                Past Medical History: Past Medical History:   Diagnosis Date    Abnormal Pap smear of cervix     HPV (human papilloma virus) infection 2018    S/p colposcopy and leep, All normal paps since that time    Pregnancy 2024      Past Surgical History Past Surgical History:   Procedure Laterality Date    APPENDECTOMY      CERVICAL BIOPSY  W/ LOOP ELECTRODE EXCISION  2018    WISDOM TOOTH EXTRACTION        Family History: Family History   Problem Relation Age of Onset    Hyperlipidemia Father     No Known Problems Mother     Breast cancer Maternal Grandmother         Diagnosed in lates 70s-80s      Social History:  reports that she has never smoked. She has never been exposed to tobacco smoke. She has never used smokeless tobacco.   reports that she does not currently use alcohol.   reports no history of drug use.        General ROS: Pertinent items are noted in HPI    Objective      Vital Signs Range for the last 24 hours  Temperature: Temp:  [97.8 °F (36.6 °C)] 97.8 °F (36.6 °C)   Temp Source: Temp src: Oral   BP: BP: ()/(56-83) 108/70   Pulse: Heart Rate:  [] 107   Respirations: Resp:  [18] 18   SPO2:     O2 Amount (l/min):     O2 Devices     Weight: Weight:  [80.7 kg (178 lb)] 80.7 kg (178 lb)     Physical Examination: General appearance - alert, well appearing, and in no distress    Presentation: vtx   Cervix: Exam by:     Dilation:     Effacement:     Station:    "      Fetal Heart Rate Assessment   Method:     Beats/min:     Baseline:     Varibility:     Accels:     Decels:     Tracing Category:       Uterine Assessment   Method:     Frequency (min):     Ctx Count in 10 min:     Duration:     Intensity:     Intensity by IUPC:     Resting Tone:     Resting Tone by IUPC:     Neto Units:       Laboratory Results:   Radiology Review:   Other Studies:     Assessment & Plan      Normal labor    39 weeks gestation of pregnancy        Assessment:  1.  Intrauterine pregnancy at 39w5d weeks gestation with reactive fetal status.    2.  Labor  3.  Obstetrical history significant for is non-contributory.  4.  GBS status: No results found for: \"GBSANTIGEN\"    Plan:  1.  Expect vaginal delivery.  2. Plan of care has been reviewed with patient and family  3.  Risks, benefits of treatment plan have been discussed.  4.  All questions have been answered.  5.        Hannah Noguera MD  2/14/2025  02:08 EST    Electronically signed by Hannah Noguera MD at 02/14/25 0209       Facility-Administered Medications as of 2/14/2025   Medication Dose Route Frequency Provider Last Rate Last Admin    acetaminophen (TYLENOL) tablet 650 mg  650 mg Oral Q6H PRN Hannah Noguera MD        benzocaine (AMERICAINE) 20 % rectal ointment 1 Application  1 Application Rectal PRN Hannah Noguera MD        benzocaine-menthol (DERMOPLAST) 20-0.5 % topical spray   Topical PRN Hannah Noguera MD   Given at 02/14/25 0753    [START ON 2/15/2025] bisacodyl (DULCOLAX) suppository 10 mg  10 mg Rectal Daily PRN Hannah Noguera MD        docusate sodium (COLACE) capsule 100 mg  100 mg Oral BID Hannah Noguera MD        HYDROcodone-acetaminophen (NORCO) 5-325 MG per tablet 1 tablet  1 tablet Oral Q4H PRN Hannah Noguera MD        Or    HYDROcodone-acetaminophen (NORCO)  MG per tablet 1 tablet  1 tablet Oral Q4H PRN Hannah Noguera MD        Hydrocortisone (Perianal) (ANUSOL-HC) 2.5 % rectal cream 1 Application  1 " Application Rectal PRN Hannah Noguera MD        ibuprofen (ADVIL,MOTRIN) tablet 600 mg  600 mg Oral Q6H PRN Hannah Noguera MD        [] lactated ringers infusion  125 mL/hr Intravenous Continuous Braeden Reynoso  mL/hr at 25 125 mL/hr at 25    lanolin topical 1 Application  1 Application Topical Q1H PRN Hannah Noguera MD   1 Application at 25 0753    magnesium hydroxide (MILK OF MAGNESIA) suspension 10 mL  10 mL Oral Daily PRN Hannah Noguera MD        [COMPLETED] ondansetron (ZOFRAN) injection 4 mg  4 mg Intravenous Once PRN Jose Mancini DO   4 mg at 25 2214    oxytocin (PITOCIN) 30 units in 0.9% sodium chloride 500 mL (premix)  125 mL/hr Intravenous Once PRN Hannah Noguera MD        [] oxytocin (PITOCIN) 30 units in 0.9% sodium chloride 500 mL (premix)  250 mL/hr Intravenous Continuous Hannah Noguera  mL/hr at 25 0454 250 mL/hr at 25 0454    promethazine (PHENERGAN) tablet 25 mg  25 mg Oral Q6H PRN Hannah Noguera MD        witch hazel-glycerin (TUCKS) pad 1 Pad  1 each Topical PRN Hannah Noguera MD   1 Pad at 25 0753     Orders (last 24 hrs)        Start     Ordered    02/15/25 0800  Sitz Bath  3 Times Daily        Comments: PRN    25 0659    02/15/25 0600  CBC & Differential  Timed        Comments: Postpartum Day 1      25 0659    02/15/25 0600  Hemoglobin & Hematocrit, Blood  Timed        Comments: Postpartum Day 1      25 0659    02/15/25 0000  bisacodyl (DULCOLAX) suppository 10 mg  Daily PRN         25 0659    25 0900  docusate sodium (COLACE) capsule 100 mg  2 Times Daily         25 0659    25 0700  Transfer Patient  Once         25 0659    25 0700  Admit To Obstetrics Inpatient  Once         25 0659    25 0700  Code Status and Medical Interventions: CPR (Attempt to Resuscitate); Full  Continuous         25 0659    25 0700  Vital Signs Per  hospital policy  Per Hospital Policy/Protocol         02/14/25 0659 02/14/25 0700  Notify Physician  Until Discontinued         02/14/25 0659 02/14/25 0700  Up Ad Alessandra  Until Discontinued         02/14/25 0659 02/14/25 0700  Ambulate Patient  Every Shift       02/14/25 0659 02/14/25 0700  Patient May Shower  Once         02/14/25 0659 02/14/25 0700  Diet: Regular/House; Fluid Consistency: Thin (IDDSI 0)  Diet Effective Now         02/14/25 0659 02/14/25 0700  Advance Diet As Tolerated -Regular  Until Discontinued         02/14/25 0659 02/14/25 0700  Fundal and Lochia Check  Per Hospital Policy/Protocol        Comments: Q 15 min x 4, Q 30 min x 2, then Q Shift    02/14/25 0659 02/14/25 0700  RN to Assess Rh Status & Place RhIG Evaluation Order if Indicated  Continuous         02/14/25 0659 02/14/25 0700  Bladder Assessment  Per Order Details        Comments: Postpartum 1) Upon Admission to Unit & Every 4 Hours PRN Until Voiding. 2) Out of Bed to Void in 8 Hours.    02/14/25 0659 02/14/25 0700  Straight Cath  Per Order Details        Comments: Postpartum: If Distended & Unable to Void, May Repeat Once.    02/14/25 0659 02/14/25 0700  Indwelling Urinary Catheter  Per Order Details        Comments: Postpartum : After Straight Cathed x2 or if Greater Than 1000mL Residual, Insert Indwelling Urinary Catheter Until Further MD Order.    02/14/25 0659 02/14/25 0700  Breast pump to bed  Once         02/14/25 0659 02/14/25 0700  If indicated -- Please administer RH Immunoglobulin based on results of cord blood evaluation and fetal screen lab tests, pharmacy to dispense  Per Order Details        Comments: See Process Instructions For Reference Range Details.    02/14/25 0659 02/14/25 0659  oxytocin (PITOCIN) 30 units in 0.9% sodium chloride 500 mL (premix)  Once As Needed         02/14/25 0659 02/14/25 0659  ibuprofen (ADVIL,MOTRIN) tablet 600 mg  Every 6 Hours PRN         02/14/25  "0659    02/14/25 0659  acetaminophen (TYLENOL) tablet 650 mg  Every 6 Hours PRN         02/14/25 0659    02/14/25 0659  HYDROcodone-acetaminophen (NORCO) 5-325 MG per tablet 1 tablet  Every 4 Hours PRN        Placed in \"Or\" Linked Group    02/14/25 0659    02/14/25 0659  HYDROcodone-acetaminophen (NORCO)  MG per tablet 1 tablet  Every 4 Hours PRN        Placed in \"Or\" Linked Group    02/14/25 0659    02/14/25 0659  magnesium hydroxide (MILK OF MAGNESIA) suspension 10 mL  Daily PRN         02/14/25 0659    02/14/25 0659  lanolin topical 1 Application  Every 1 Hour PRN         02/14/25 0659    02/14/25 0659  benzocaine-menthol (DERMOPLAST) 20-0.5 % topical spray  As Needed         02/14/25 0659    02/14/25 0659  witch hazel-glycerin (TUCKS) pad 1 Pad  As Needed         02/14/25 0659    02/14/25 0659  Hydrocortisone (Perianal) (ANUSOL-HC) 2.5 % rectal cream 1 Application  As Needed         02/14/25 0659    02/14/25 0659  benzocaine (AMERICAINE) 20 % rectal ointment 1 Application  As Needed         02/14/25 0659    02/14/25 0659  promethazine (PHENERGAN) tablet 25 mg  Every 6 Hours PRN         02/14/25 0659    02/14/25 0615  oxytocin (PITOCIN) 30 units in 0.9% sodium chloride 500 mL (premix)  Continuous        Placed in \"Followed by\" Linked Group    02/14/25 0513    02/14/25 0600  oxytocin (PITOCIN) 30 units in 0.9% sodium chloride 500 mL (premix)  Once,   Status:  Discontinued        Placed in \"Followed by\" Linked Group    02/14/25 0513    02/14/25 0514  Nurse may remove epidural catheter after delivery.  Until Discontinued         02/14/25 0513 02/14/25 0514  Transfer to postpartum when discharge criteria met.  Until Discontinued         02/14/25 0513 02/14/25 0514  Vital Signs Per hospital policy  Per Hospital Policy/Protocol         02/14/25 0513 02/14/25 0514  Intake & Output  Every Shift       02/14/25 0513    02/14/25 0514  Fundal & Lochia Check  Every Shift       02/14/25 0513    02/14/25 0514  " "Fundal & Lochia Check  Per Order Details        Comments: Every 15 Minutes x4, Then Every 30 Minutes x2, Then Every Shift    02/14/25 0513    02/14/25 0514  Diet: Regular/House; Fluid Consistency: Thin (IDDSI 0)  Diet Effective Now,   Status:  Canceled         02/14/25 0513 02/14/25 0513  acetaminophen (TYLENOL) tablet 650 mg  Every 4 Hours PRN,   Status:  Discontinued         02/14/25 0513 02/14/25 0513  morphine injection 2 mg  Every 2 Hours PRN,   Status:  Discontinued         02/14/25 0513 02/14/25 0513  oxyCODONE-acetaminophen (PERCOCET) 7.5-325 MG per tablet 2 tablet  Every 4 Hours PRN,   Status:  Discontinued         02/14/25 0513 02/14/25 0513  promethazine (PHENERGAN) suppository 12.5 mg  Every 6 Hours PRN,   Status:  Discontinued        Placed in \"Or\" Linked Group    02/14/25 0513 02/14/25 0513  promethazine (PHENERGAN) tablet 12.5 mg  Every 6 Hours PRN,   Status:  Discontinued        Placed in \"Or\" Linked Group    02/14/25 0513 02/14/25 0513  methylergonovine (METHERGINE) injection 200 mcg  Once As Needed,   Status:  Discontinued         02/14/25 0513 02/14/25 0503  Place Sequential Compression Device  Once,   Status:  Canceled         02/14/25 0502    02/14/25 0503  Maintain Sequential Compression Device  Continuous,   Status:  Canceled         02/14/25 0502    02/14/25 0503  VTE Prophylaxis Not Indicated: Low Risk; No Risk Factors (0)  Once         02/14/25 0502    02/14/25 0458  VTE Prophylaxis Not Indicated: Low Risk; No Risk Factors (0)  Once         02/14/25 0459    02/14/25 0400  oxytocin (PITOCIN) 30 units in 0.9% sodium chloride 500 mL (premix)  Titrated,   Status:  Discontinued         02/14/25 0301    02/14/25 0115  lactated ringers infusion  Continuous,   Status:  Discontinued         02/14/25 0026    02/13/25 2100  Sod Citrate-Citric Acid (BICITRA) oral solution 30 mL  Once,   Status:  Discontinued         02/13/25 2004 02/13/25 2100  fentaNYL 1 mcg/mL, Ropivacaine " 0.2% in 200mL NS epidural  Continuous,   Status:  Discontinued         02/13/25 2004 02/13/25 2023  ! Epidural  Continuous PRN,   Status:  Discontinued         02/13/25 2024 02/13/25 2015  lactated ringers infusion  Continuous         02/13/25 1917 02/13/25 2005  Vital Signs Per Anesthesia Guidelines  Per Order Details,   Status:  Canceled        Comments: Every 3 Minutes x20 Minutes Following Epidural Dosing, Then Every 15 Minutes If Stable    02/13/25 2004 02/13/25 2005  Start IV with #16 or #18 gauge angiocath.  Once,   Status:  Canceled         02/13/25 2004 02/13/25 2005  Nurse or anesthesiologist to remain with patient for 15 minutes following dosing.  Until Discontinued,   Status:  Canceled         02/13/25 2004 02/13/25 2005  Facilitate maternal postion on side and maintain uterine displacement.  Until Discontinued,   Status:  Canceled         02/13/25 2004 02/13/25 2005  Consult anesthesia services prior to changing epidural infusion/rate.  Until Discontinued,   Status:  Canceled         02/13/25 2004 02/13/25 2004  lactated ringers bolus 1,000 mL  As Needed,   Status:  Discontinued         02/13/25 2004 02/13/25 2004  ePHEDrine Sulfate (Pressors) 5 MG/ML injection 10 mg  Every 10 Minutes PRN,   Status:  Discontinued         02/13/25 2004 02/13/25 2004  metoclopramide (REGLAN) injection 10 mg  Once As Needed,   Status:  Discontinued         02/13/25 2004 02/13/25 2004  ondansetron (ZOFRAN) injection 4 mg  Once As Needed         02/13/25 2004 02/13/25 2004  famotidine (PEPCID) injection 20 mg  Once As Needed,   Status:  Discontinued         02/13/25 2004 02/13/25 2004  diphenhydrAMINE (BENADRYL) injection 12.5 mg  Every 8 Hours PRN,   Status:  Discontinued         02/13/25 2004 02/13/25 1949  ABO RH Specimen Verification  STAT         02/13/25 1949 02/13/25 1918  Type & Screen  STAT         02/13/25 1917 02/13/25 1918  CBC (No Diff)  STAT         02/13/25 1917     02/13/25 1918  Preeclampsia Panel  Once         02/13/25 1917 02/13/25 1918  Treponema pallidum AB w/Reflex RPR  Once         02/13/25 1917    Unscheduled  Apply Ice to Perineum  As Needed      Comments: For 20 min q 2 hrs    02/14/25 0659    Unscheduled  Waffle Cushion  As Needed      Comments: For perineal discomfort    02/14/25 0659    Unscheduled  Kpad  As Needed      Comments: For pain    02/14/25 0659    Unscheduled  Warm compress  As Needed       02/14/25 0659    Unscheduled  Apply ace wrap, tight bra, or binder  As Needed       02/14/25 0659    Unscheduled  Apply ice packs  As Needed       02/14/25 0659    Unscheduled  Up with Assistance  As Needed       02/14/25 0513    Unscheduled  Apply Ice to Perineum  As Needed       02/14/25 0513    Unscheduled  Bladder Assessment  As Needed       02/14/25 0513    Pending  Admit To Obstetrics Inpatient  Once         Pending    Pending  Code Status and Medical Interventions: CPR (Attempt to Resuscitate); Full Support  Continuous         Pending    Pending  Obtain Informed Consent  Once         Pending    Pending  Vital Signs q 4 while awake  Every 4 Hours      Comments: While the patient is awake.    Pending    Pending  Vital Signs Per Hospital Policy  Per Hospital Policy/Protocol         Pending    Pending  Mini-Prep Prior to Delivery  Once         Pending    Pending  Continuous Fetal Monitoring With NST on Admission and Prior to Initiation of Oxytocin.  Per Order Details        Comments: Continuous Fetal Monitoring With NST on Admission & Prior to Initiation of Oxytocin.    Pending    Pending  External Uterine Contraction Monitoring  Per Hospital Policy/Protocol         Pending    Pending  Notify Provider (Specified)  Until Discontinued         Pending    Pending  Notify Provider of Tachysystole (Per Hospital Algorithm)  Until Discontinued         Pending    Pending  Notify Provider if Membranes Ruptured, Bleeding Greater Than 1 Pad Per Hour, Fetal Heart Tone  "Abnormality or Severe Pain  Until Discontinued         Pending    Pending  Position Change - For Intra-Uterine Resusitation for Hypertonus, HyperStimulation or Non-Reassuring Fetal Status  As Needed         Pending    Pending  Insert Peripheral IV  Once         Pending    Pending  Saline Lock & Maintain IV Access  Continuous         Pending    Pending  sodium chloride 0.9 % flush 10 mL  Every 12 Hours Scheduled         Pending    Pending  sodium chloride 0.9 % flush 10 mL  As Needed         Pending    Pending  sodium chloride 0.9 % infusion 40 mL  As Needed         Pending    Pending  lidocaine PF 1% (XYLOCAINE) injection 0.5 mL  Once As Needed         Pending    Pending  lactated ringers bolus 1,000 mL  Once As Needed         Pending    Pending  lactated ringers infusion  Continuous         Pending    Pending  acetaminophen (TYLENOL) tablet 650 mg  Every 4 Hours PRN         Pending    Pending  mineral oil liquid 30 mL  Once         Pending    Pending  Notify Provider (Specified)  Until Discontinued         Pending    Pending  Vital Signs Per Hospital Policy  Per Hospital Policy/Protocol         Pending    Pending  Fundal & Lochia Check  Per Order Details      Comments: Every 15 Minutes x4, Then Every 30 Minutes x2, Then Every Shift    Pending    Pending  Fundal & Lochia Check  Every Shift       Pending    Pending  Diet: Regular/House; Fluid Consistency: Thin (IDDSI 0)  Diet Effective Now         Pending    Pending  oxytocin (PITOCIN) 30 units in 0.9% sodium chloride 500 mL (premix)  Once        Placed in \"Followed by\" Linked Group    Pending    Pending  oxytocin (PITOCIN) 30 units in 0.9% sodium chloride 500 mL (premix)  Continuous        Placed in \"Followed by\" Linked Group    Pending    Pending  methylergonovine (METHERGINE) injection 200 mcg  Once As Needed         Pending    Pending  carboprost (HEMABATE) injection 250 mcg  As Needed         Pending    Pending  miSOPROStol (CYTOTEC) tablet 800 mcg  As Needed    " "     Pending    Pending  VTE Prophylaxis Not Indicated: Low Risk; No Risk Factors (0)  Once         Pending    Pending  Diet: Liquid; Clear Liquid; Fluid Consistency: Thin (IDDSI 0)  Diet Effective Now         Pending    Pending  Lactate Dehydrogenase  Once         Pending    Pending  Uric Acid  Once         Pending    Pending  Comprehensive Metabolic Panel  Once         Pending    Pending  fentaNYL citrate (PF) (SUBLIMAZE) injection 50 mcg  Every 1 Hour PRN         Pending    Pending  fentaNYL citrate (PF) (SUBLIMAZE) injection 100 mcg  Every 1 Hour PRN         Pending    Pending  ondansetron ODT (ZOFRAN-ODT) disintegrating tablet 4 mg  Every 6 Hours PRN        Placed in \"Or\" Linked Group    Pending    Pending  ondansetron (ZOFRAN) injection 4 mg  Every 6 Hours PRN        Placed in \"Or\" Linked Group    Pending    Pending  Target Arousal Level RASS -1 to -2  Continuous         Pending                     Operative/Procedure Notes (last 24 hours)        Hannah Noguera MD at 25 0454           [facility]   Vaginal Delivery Note    Patient Name: Ariane Vasquez  : 1995  MRN: [id number]    Date of Delivery: 2025    Diagnosis     Pre & Post-Delivery:  Intrauterine pregnancy at 39w5d  Labor status: Spontaneous Onset of Labor    Normal labor    39 weeks gestation of pregnancy             Problem List    Transfer to Postpartum     Review the Delivery Report for details.     Delivery     Delivery:      YOB: 2027   Time of Birth:  Gestational Age 4:26 AM  39w5d     Anesthesia: Epidural    Delivering clinician: Hannah Noguera   Forceps?   No   Vacuum? No    Shoulder dystocia present: No        Delivery narrative: Patient pushed for 3 hours.  Vertex was transverse but rotated to occiput anterior and then progress began.  Patient crowned and delivered over midline episiotomy under epidural a female.  Loose nuchal cord released off the perineum baby delivered crying Cord milked cord " "cut and clamped baby handed off to nurses crying.  Cord blood obtained placenta expressed uterus explored.  A midline episiotomy which was about 5 cm long was closed with 2-0 chromic running lock stitch.        Infant     Findings: female infant     Infant observations: Weight: 3990 g (8 lb 12.7 oz)  Length: 19 in  Observations/Comments:        Apgars: 8  @ 1 minute /    9  @ 5 minutes   Infant Name:      Placenta & Cord         Placenta delivered  Spontaneous at   2/14/2025  4:33 AM    Cord:   present.   Nuchal Cord?  yes; Number of nuchal loops present:      Cord blood obtained: Yes   Cord gases obtained:      Cord gas results: Venous:  No results found for: \"PHCVEN\", \"BECVEN\"    Arterial:  No results found for: \"PHCART\", \"BECART\"     Repair     Episiotomy: Median    Yes  Suture used for repair: 2-0 chromic gut    Lacerations: No   Estimated Blood Loss: Est. Blood Loss (mL): 150 mL (Filed from Delivery Summary) (02/14/25 0426)     Quantitative Blood Loss:  150        Complications     none    Disposition     Mother to Mother Baby/Postpartum  in stable condition currently.  Baby to NBN  in stable condition currently.    Hannah Noguera MD  02/14/25  04:54 EST          Electronically signed by Hannah Noguera MD at 02/14/25 0456       Physician Progress Notes (last 24 hours)  Notes from 02/13/25 0803 through 02/14/25 0803   No notes of this type exist for this encounter.       "

## 2025-02-14 NOTE — LACTATION NOTE
25 0800   Maternal Information   Date of Referral 25   Person Making Referral lactation consultant  (courtesy)   Maternal Reason for Referral no prior breastfeeding experience  (Assisted by MEGHNA (PCT) with feeding prior to LC visit.  Mom states infant latched and nursed well bilaterally.  Breastfeeding education provided, information given.  Mom has pump at home, FOB to bring.  Encouraged mom to call for assistance prn.)   Infant Reason for Referral  infant   Maternal Assessment   Breast Size Issue none   Breast Shape Bilateral:;round   Breast Density Bilateral:;dense   Nipples Bilateral:;everted   Left Nipple Symptoms intact;nontender   Right Nipple Symptoms intact;nontender   Maternal Infant Feeding   Maternal Emotional State receptive;relaxed   Support Person Involvement verbally supports mother   Milk Expression/Equipment   Breast Pump Type double electric, personal   Equipment for Home Use breast pump ordered through insurance

## 2025-02-15 LAB
BASOPHILS # BLD AUTO: 0.05 10*3/MM3 (ref 0–0.2)
BASOPHILS NFR BLD AUTO: 0.4 % (ref 0–1.5)
DEPRECATED RDW RBC AUTO: 45.7 FL (ref 37–54)
EOSINOPHIL # BLD AUTO: 0.06 10*3/MM3 (ref 0–0.4)
EOSINOPHIL NFR BLD AUTO: 0.5 % (ref 0.3–6.2)
ERYTHROCYTE [DISTWIDTH] IN BLOOD BY AUTOMATED COUNT: 13 % (ref 12.3–15.4)
HCT VFR BLD AUTO: 39.3 % (ref 34–46.6)
HGB BLD-MCNC: 13.1 G/DL (ref 12–15.9)
IMM GRANULOCYTES # BLD AUTO: 0.08 10*3/MM3 (ref 0–0.05)
IMM GRANULOCYTES NFR BLD AUTO: 0.6 % (ref 0–0.5)
LYMPHOCYTES # BLD AUTO: 2.84 10*3/MM3 (ref 0.7–3.1)
LYMPHOCYTES NFR BLD AUTO: 21.5 % (ref 19.6–45.3)
MCH RBC QN AUTO: 32.3 PG (ref 26.6–33)
MCHC RBC AUTO-ENTMCNC: 33.3 G/DL (ref 31.5–35.7)
MCV RBC AUTO: 97 FL (ref 79–97)
MONOCYTES # BLD AUTO: 0.87 10*3/MM3 (ref 0.1–0.9)
MONOCYTES NFR BLD AUTO: 6.6 % (ref 5–12)
NEUTROPHILS NFR BLD AUTO: 70.4 % (ref 42.7–76)
NEUTROPHILS NFR BLD AUTO: 9.28 10*3/MM3 (ref 1.7–7)
NRBC BLD AUTO-RTO: 0 /100 WBC (ref 0–0.2)
PLATELET # BLD AUTO: 167 10*3/MM3 (ref 140–450)
PMV BLD AUTO: 11.7 FL (ref 6–12)
RBC # BLD AUTO: 4.05 10*6/MM3 (ref 3.77–5.28)
WBC NRBC COR # BLD AUTO: 13.18 10*3/MM3 (ref 3.4–10.8)

## 2025-02-15 PROCEDURE — 85025 COMPLETE CBC W/AUTO DIFF WBC: CPT | Performed by: OBSTETRICS & GYNECOLOGY

## 2025-02-15 RX ADMIN — ACETAMINOPHEN 650 MG: 325 TABLET ORAL at 16:14

## 2025-02-15 RX ADMIN — ACETAMINOPHEN 650 MG: 325 TABLET ORAL at 23:33

## 2025-02-15 RX ADMIN — IBUPROFEN 600 MG: 600 TABLET, FILM COATED ORAL at 04:26

## 2025-02-15 RX ADMIN — ACETAMINOPHEN 650 MG: 325 TABLET ORAL at 08:50

## 2025-02-15 RX ADMIN — DOCUSATE SODIUM 100 MG: 100 CAPSULE, LIQUID FILLED ORAL at 08:50

## 2025-02-15 RX ADMIN — IBUPROFEN 600 MG: 600 TABLET, FILM COATED ORAL at 11:34

## 2025-02-15 RX ADMIN — IBUPROFEN 600 MG: 600 TABLET, FILM COATED ORAL at 20:22

## 2025-02-15 RX ADMIN — DOCUSATE SODIUM 100 MG: 100 CAPSULE, LIQUID FILLED ORAL at 20:22

## 2025-02-15 NOTE — LACTATION NOTE
02/15/25 1417   Maternal Information   Person Making Referral lactation consultant  (courtesy follow up)   Maternal Reason for Referral breastfeeding currently;breast/nipple pain  (left soreness)   Infant Reason for Referral one breast preferred  (infant tense on right side, head pulling toward shoulder, impacting feeding positioning)   Maternal Assessment   Breast Size Issue none   Breast Shape Bilateral:;round   Breast Density Bilateral:;soft   Nipples Bilateral:;short;graspable   Left Nipple Symptoms redness;painful   Right Nipple Symptoms redness;tender   Maternal Infant Feeding   Maternal Emotional State independent;receptive;relaxed   Infant Positioning cross-cradle  (Left)   Signs of Milk Transfer deep jaw excursions noted  (encouraged burping before breastfeeding, removing clothing for skin to skin contact, and stimulation throughout breastfeeding for quality milk transfer during breastfeeding session)   Pain with Feeding no  (with positional adjustments, per pt report)   Comfort Measures Before/During Feeding infant position adjusted;latch adjusted;maternal position adjusted;suction broken using finger  (encouraged nipple to nose positioning and bringing baby to the breast by supporting shoulders for chin extension and unrolling upper lip for deeper latch)   Latch Assistance minimal assistance   Support Person Involvement actively supporting mother   Milk Expression/Equipment   Breast Pump Type double electric, personal   Breast Pumping   Breast Pumping Interventions post-feed pumping encouraged  (for short/missed feedings, if supplementation is required, or if breastfeeding becomes too painful, to encourage breastmilk production)   Lactation Referrals   Lactation Referrals outpatient lactation program   Outpatient Lactation Program Lactation Follow-up Date/Time encouraged     All questions answered at this time. PRN Lactation Consultant/Outpatient Lactation Clinic contact encouraged.

## 2025-02-15 NOTE — PROGRESS NOTES
Saleem Vasquez  : 1995  MRN: [id number]  CSN: [id number]    Hospital Day: 3    Postpartum Day #1  Subjective     CC: hospital follow-up    Her pain is well controlled.  Vaginal bleeding is less than a normal period.  She is voiding normally. She has not had a bowel movement yet.      Objective     Min/max vitals past 24 hours:   Temp  Min: 97.6 °F (36.4 °C)  Max: 98 °F (36.7 °C)  BP  Min: 106/69  Max: 113/69  Pulse  Min: 69  Max: 78  Resp  Min: 16  Max: 18        Abdomen: soft, nontender; bowel sounds normal; no masses   fundus firm and nontender   Pelvic: deferred     Results from last 7 days   Lab Units 02/15/25  0344 25  193   WBC 10*3/mm3 13.18* 10.81*   HEMOGLOBIN g/dL 13.1 14.8   HEMATOCRIT % 39.3 42.1   PLATELETS 10*3/mm3 167 161     Lab Results   Component Value Date    RH Positive 2025    HEPBSAG Negative 2024     Results from last 7 days   Lab Units 25  193   TREPONEMA PALLIDUM AB TOTAL  Non-Reactive            Assessment   Postpartum Day #1 S/P vaginal delivery  Doing well     Plan   Continue routine postpartum care  Anticipate d/c home tomorrow    CATHERINE Cerda  2/15/2025  09:26 EST

## 2025-02-16 VITALS
HEIGHT: 67 IN | SYSTOLIC BLOOD PRESSURE: 111 MMHG | BODY MASS INDEX: 27.94 KG/M2 | WEIGHT: 178 LBS | TEMPERATURE: 97.9 F | RESPIRATION RATE: 16 BRPM | DIASTOLIC BLOOD PRESSURE: 81 MMHG | HEART RATE: 67 BPM

## 2025-02-16 RX ORDER — PSEUDOEPHEDRINE HCL 30 MG
100 TABLET ORAL 2 TIMES DAILY
Qty: 60 CAPSULE | Refills: 1 | Status: SHIPPED | OUTPATIENT
Start: 2025-02-16

## 2025-02-16 RX ORDER — POLYETHYLENE GLYCOL 3350 17 G/17G
17 POWDER, FOR SOLUTION ORAL DAILY
Status: DISCONTINUED | OUTPATIENT
Start: 2025-02-16 | End: 2025-02-16 | Stop reason: HOSPADM

## 2025-02-16 RX ORDER — IBUPROFEN 600 MG/1
600 TABLET, FILM COATED ORAL EVERY 6 HOURS PRN
Qty: 120 TABLET | Refills: 1 | Status: SHIPPED | OUTPATIENT
Start: 2025-02-16

## 2025-02-16 RX ORDER — POLYETHYLENE GLYCOL 3350 17 G/17G
17 POWDER, FOR SOLUTION ORAL DAILY
Qty: 238 G | Refills: 0 | Status: SHIPPED | OUTPATIENT
Start: 2025-02-16 | End: 2025-02-22 | Stop reason: HOSPADM

## 2025-02-16 RX ADMIN — DOCUSATE SODIUM 100 MG: 100 CAPSULE, LIQUID FILLED ORAL at 08:36

## 2025-02-16 RX ADMIN — IBUPROFEN 600 MG: 600 TABLET, FILM COATED ORAL at 02:35

## 2025-02-16 RX ADMIN — POLYETHYLENE GLYCOL 3350 17 G: 17 POWDER, FOR SOLUTION ORAL at 12:56

## 2025-02-16 RX ADMIN — IBUPROFEN 600 MG: 600 TABLET, FILM COATED ORAL at 08:36

## 2025-02-16 RX ADMIN — ACETAMINOPHEN 650 MG: 325 TABLET ORAL at 12:56

## 2025-02-16 RX ADMIN — ACETAMINOPHEN 650 MG: 325 TABLET ORAL at 05:26

## 2025-02-16 NOTE — DISCHARGE SUMMARY
Discharge Summary     Saleem Vasquez  : 1995  MRN: [id number]  CSN: [id number]    Date of Admission: 2025   Date of Discharge:  2025   Delivering Physician: Hannah Noguera       Admission Diagnosis: Currently pregnant [Z34.90]   Discharge Diagnosis: Same as above plus  Pregnancy at 39w5d - delivered       Procedures: 2025 - Vaginal, Spontaneous      Hospital Course  Patient is a 30 y.o.  who at 39w5d had a vaginal birth.  Her postpartum course was without complications.  On PPD # 2 she was ready for discharge.  She had normal lochia and pain well controlled with oral medications. Her baby is staying an extra night under phototherapy for jaundice.     Infant  female fetus weighing 3990 g (8 lb 12.7 oz)  Apgars -  8 @ 1 minute /  9 @ 5 minutes.    Discharge labs  Lab Results   Component Value Date    WBC 13.18 (H) 02/15/2025    HGB 13.1 02/15/2025    HCT 39.3 02/15/2025     02/15/2025       Discharge Medications     Discharge Medications        ASK your doctor about these medications        Instructions Start Date   cetirizine 10 MG tablet  Commonly known as: zyrTEC   10 mg, Daily      Fish Oil 300 MG capsule       fluticasone 0.05 % cream  Commonly known as: CUTIVATE   APPLY TO AFFECTED AREA TWICE DAILY FOR 2 WEEKS, THEN TAKE A 1 WEEK BREAK. REPEAT AS NEEDED      PRENATAL 1 + IRON PO   Take  by mouth.      tacrolimus 0.1 % ointment  Commonly known as: PROTOPIC   apply to affected area twice a day      Unisom SleepTabs 25 MG tablet  Generic drug: doxylamine                Discharge Disposition    Condition on Discharge: good   Follow-up: 6 weeks with Dr. Suresh Crowell, APRN  2025

## 2025-02-16 NOTE — PLAN OF CARE
Goal Outcome Evaluation:  Plan of Care Reviewed With: patient        Progress: improving  Outcome Evaluation: VSS,fundus/lochia/perineum WDL,pain well controlled with prn pain meds around the clock,voiding without difficulty,utilizing demarcus-products,nipples sore but breastfeeding good

## 2025-02-16 NOTE — PROGRESS NOTES
KODY Monge  Ariane Vasquez  : 1995  MRN: [id number]  CSN: [id number]    Hospital Day: 4    Postpartum Day #2  Subjective     CC: hospital follow-up    Her pain is well controlled.  Vaginal bleeding is less than a normal period. Her baby is staying an extra night due to jaundice and bili light therapy. She has not had a bowel movement yet.      Objective     Min/max vitals past 24 hours:   Temp  Min: 97.9 °F (36.6 °C)  Max: 98.2 °F (36.8 °C)  BP  Min: 103/64  Max: 120/71  Pulse  Min: 59  Max: 67  Resp  Min: 16  Max: 16        General: well developed; well nourished  no acute distress   Abdomen: fundus firm and non-tender   Pelvic: Not performed   Ext: Calves NT     Results from last 7 days   Lab Units 02/15/25  0344 25  1930   WBC 10*3/mm3 13.18* 10.81*   HEMOGLOBIN g/dL 13.1 14.8   HEMATOCRIT % 39.3 42.1   PLATELETS 10*3/mm3 167 161     Lab Results   Component Value Date    RH Positive 2025    HEPBSAG Negative 2024     Results from last 7 days   Lab Units 25  1930   TREPONEMA PALLIDUM AB TOTAL  Non-Reactive            Assessment   Postpartum Day #2 S/P vaginal delivery  Doing well     Plan   Discharge to home  Advised no tampons or intercourse for 6 weeks.  D/C questions all answered  Follow-up appointment in 6 week(s)    Serena Crowell, CATHERINE  2025  09:31 EST

## 2025-02-16 NOTE — LACTATION NOTE
02/16/25 1225   Maternal Information   Date of Referral 02/16/25   Person Making Referral lactation consultant   Maternal Reason for Referral breastfeeding currently   Infant Reason for Referral hyperbilirubinemia  (phototherapy started)   Maternal Assessment   Left Nipple Symptoms tender   Right Nipple Symptoms tender   Maternal Infant Feeding   Maternal Emotional State independent   Latch Assistance none needed  (declined at this time)   Milk Expression/Equipment   Breast Pump Type double electric, personal  (lisette)   Breast Pumping   Breast Pumping Interventions post-feed pumping encouraged  (d/t elevated bili)     Courtesy follow up visit due to infant being started on phototherapy this morning. Encouraged to start pumping after feedings to encourage supply. MOB reports feeling like her breasts are aldridge today and milk is beginning to transition. She reports seeing milk/colostrum in baby's mouth at end of feedings. Pt has her Lisette pump at bedside. Encouraged to start pumping after next feeding, can use any expressed milk as supplement as needed. Encouraged to call as needs arise.

## 2025-02-17 ENCOUNTER — MATERNAL SCREENING (OUTPATIENT)
Dept: CALL CENTER | Facility: HOSPITAL | Age: 30
End: 2025-02-17
Payer: OTHER GOVERNMENT

## 2025-02-17 NOTE — PAYOR COMM NOTE
"Lukas Vasquez (30 y.o. Female)     From:Chyna Leonard LPN, Utilization Review  Phone #[phone]  Fax #[phone]     ID# [id number]  ADDRESS:  [address] [place] [address]  PHONE #[phone]  FACILITY:  [facility] NPI#[id number] TAX ID#[id number]  [address] [place] [address]  PHONE #[phone]  PHYSICIAN:  DR JUANJO RODRIGUEZ NPI#[id number]  [address] [facility] [address]  [place] [id number]  PHONE #[phone]  DIAGNOSIS CODE:  O80 VAGINAL DELIVERY    Discharged 2/16/25.          Date of Birth   1995    Social Security Number       Address   [address] [place] [address]    Home Phone   [phone]    MRN   [id number]       Sikh   Religion    Marital Status                               Admission Date   2/13/25    Admission Type   Elective    Admitting Provider   Hannah Noguera MD    Attending Provider       Department, Room/Bed   [facility] MOTHER BABY 4A, N420/1       Discharge Date   2/16/2025    Discharge Disposition   Home or Self Care    Discharge Destination                                 Attending Provider: (none)   Allergies: Sulfa Antibiotics    Isolation: None   Infection: None   Code Status: Prior    Ht: 170.2 cm (67\")   Wt: 80.7 kg (178 lb)    Admission Cmt: None   Principal Problem: Currently pregnant [Z34.90]                   Active Insurance as of 2/13/2025       Primary Coverage       Payor Plan Insurance Group Employer/Plan Group      PRIME        Payor Plan Address Payor Plan Phone Number Payor Plan Fax Number Effective Dates    [address]   9/13/2024 - None Entered    ATTN: NEW CLAIMS       [facility] [id number]         Subscriber Name Subscriber Birth Date Member ID       LUKAS VASQUEZ 1995 [id number]                     Emergency Contacts        (Rel.) Home Phone Work Phone Mobile Phone    WILL TANNER (Spouse) [phone] -- -- "              Insurance Information                  [employer] Phone: --    Subscriber: Ariane Vasquez Subscriber#: [id number]    Group#: -- Precert#: --    Authorization#: -- Effective Date: --             History & Physical        Hannah Noguera MD at 25 0206          [facility]  Obstetric History and Physical    No chief complaint on file.      Subjective    Patient is a 30 y.o. female  currently at 39w5d, who presents with labor at term..    Her prenatal care is benign.  Her previous obstetric/gynecological history is noted for is non-contributory.    The following portions of the patients history were reviewed and updated as appropriate: current medications .       Prenatal Information:  Prenatal Results       Initial Prenatal Labs       Test Value Reference Range Date Time    Hemoglobin  13.3 g/dL 11.1 - 15.9 24 1456    Hematocrit  40.9 % 34.0 - 46.6 24 1456    Platelets  244 x10E3/uL 150 - 450 24 1456    Rubella IgG  <0.90 index Immune >0.99 24 1456    Hepatitis B SAg  Negative  Negative 24 1456    Hepatitis C Ab  Non Reactive  Non Reactive 24 1456    RPR  Non Reactive  Non Reactive 24 1016       Non Reactive  Non Reactive 24 1456    T. Pallidum Ab   Non-Reactive  Non-Reactive 25 1930    ABO  O   25    Rh  Positive   25    Antibody Screen  Negative  Negative 24 1456    HIV  Non Reactive  Non Reactive 24 1456    Urine Culture  Final report   24 1456    Gonorrhea  Negative  Negative 01/10/25        Negative  Negative 24 1456    Chlamydia  Negative  Negative 01/10/25        Negative  Negative 24 1456    TSH        HgB A1c         Varicella IgG        Hemoglobinopathy Fractionation        Hemoglobinopathy (genetic testing)        Cystic fibrosis         Spinal muscular atrophy        Fragile X                  Fetal testing        Test Value Reference Range Date Time    NIPT         MSAFP  *Screen Negative*   09/10/24 1644    AFP-4                  2nd and 3rd Trimester       Test Value Reference Range Date Time    Hemoglobin (repeated)  14.8 g/dL 12.0 - 15.9 02/13/25 1930       12.6 g/dL 12.0 - 15.9 12/06/24 1016    Hematocrit (repeated)  42.1 % 34.0 - 46.6 02/13/25 1930       37.6 % 34.0 - 46.6 12/06/24 1016    Platelets   161 10*3/mm3 140 - 450 02/13/25 1930       211 10*3/mm3 140 - 450 12/06/24 1016       244 x10E3/uL 150 - 450 07/11/24 1456    1 hour GTT   106 mg/dL 65 - 139 12/06/24 1016    Antibody Screen (repeated)  Negative   02/13/25 1930       Negative  Negative 12/06/24 1016    3rd TM syphilis scrn (repeated)  RPR   Non Reactive  Non Reactive 12/06/24 1016    3rd TM syphilis scrn (repeated) TP-Ab  Non-Reactive  Non-Reactive 02/13/25 1930    3rd TM syphilis screen TB-Ab (FTA)  Non-Reactive  Non-Reactive 02/13/25 1930    Syphilis cascade test TP-Ab (EIA)        Syphilis cascade TPPA        GTT Fasting        GTT 1 Hr        GTT 2 Hr        GTT 3 Hr        Group B Strep  No Group B Streptococcus isolated   01/24/25 1824              Other testing        Test Value Reference Range Date Time    Parvo IgG         CMV IgG                   Drug Screening       Test Value Reference Range Date Time    Amphetamine Screen  Negative ng/mL Uggrjv=5300 07/11/24 1456    Barbiturate Screen  Negative ng/mL Cdpmxw=109 07/11/24 1456    Benzodiazepine Screen  Negative ng/mL Nbuwev=546 07/11/24 1456    Methadone Screen  Negative ng/mL Snqyur=791 07/11/24 1456    Phencyclidine Screen  Negative ng/mL Cutoff=25 07/11/24 1456    Opiates Screen  Negative ng/mL Pijsbu=583 07/11/24 1456    THC Screen  Negative ng/mL Cutoff=20 07/11/24 1456    Cocaine Screen  Negative ng/mL Crozbz=247 07/11/24 1456    Propoxyphene Screen  Negative ng/mL Liseln=178 07/11/24 1456    Buprenorphine Screen        Methamphetamine Screen        Oxycodone Screen        Tricyclic Antidepressants Screen                  Legend    ^:  Historical                          External Prenatal Results       Pregnancy Outside Results - Transcribed From Office Records - See Scanned Records For Details       Test Value Date Time    ABO  O  02/13/25 2050    Rh  Positive  02/13/25 2050    Antibody Screen  Negative  02/13/25 1930       Negative  12/06/24 1016       Negative  07/11/24 1456    Varicella IgG       Rubella  <0.90 index 07/11/24 1456    Hgb  14.8 g/dL 02/13/25 1930       12.6 g/dL 12/06/24 1016       13.3 g/dL 07/11/24 1456    Hct  42.1 % 02/13/25 1930       37.6 % 12/06/24 1016       40.9 % 07/11/24 1456    HgB A1c        1h GTT  106 mg/dL 12/06/24 1016    3h GTT Fasting       3h GTT 1 hour       3h GTT 2 hour       3h GTT 3 hour        Gonorrhea (discrete)  Negative  01/10/25        Negative  07/11/24 1456    Chlamydia (discrete)  Negative  01/10/25        Negative  07/11/24 1456    RPR  Non Reactive  12/06/24 1016       Non Reactive  07/11/24 1456    Syphils cascade: TP-Ab (FTA)  Non-Reactive  02/13/25 1930    TP-Ab  Non-Reactive  02/13/25 1930    TP-Ab (EIA)       TPPA       HBsAg  Negative  07/11/24 1456    Herpes Simplex Virus PCR       Herpes Simplex VIrus Culture       HIV  Non Reactive  07/11/24 1456    Hep C RNA Quant PCR       Hep C Antibody  Non Reactive  07/11/24 1456    AFP  46.6 ng/mL 09/10/24 1644    NIPT       Cystic Fibrosis (Brianda)       Cystic Fibroisis        Spinal Muscular atrophy       Fragile X       Group B Strep  No Group B Streptococcus isolated  01/24/25 1824    GBS Susceptibility to Clindamycin       GBS Susceptibility to Erythromycin       Fetal Fibronectin       Genetic Testing, Maternal Blood                 Drug Screening       Test Value Date Time    Urine Drug Screen       Amphetamine Screen  Negative ng/mL 07/11/24 1456    Barbiturate Screen  Negative ng/mL 07/11/24 1456    Benzodiazepine Screen  Negative ng/mL 07/11/24 1456    Methadone Screen  Negative ng/mL 07/11/24 1456    Phencyclidine Screen  Negative  ng/mL 24 1456    Opiates Screen       THC Screen       Cocaine Screen       Propoxyphene Screen  Negative ng/mL 24 1456    Buprenorphine Screen       Methamphetamine Screen       Oxycodone Screen       Tricyclic Antidepressants Screen                 Legend    ^: Historical                             Past OB History:     OB History    Para Term  AB Living   1 0 0 0 0 0   SAB IAB Ectopic Molar Multiple Live Births   0 0 0 0 0 0      # Outcome Date GA Lbr Ashutosh/2nd Weight Sex Type Anes PTL Lv   1 Current                Past Medical History: Past Medical History:   Diagnosis Date    Abnormal Pap smear of cervix     HPV (human papilloma virus) infection 2018    S/p colposcopy and leep, All normal paps since that time    Pregnancy 2024      Past Surgical History Past Surgical History:   Procedure Laterality Date    APPENDECTOMY      CERVICAL BIOPSY  W/ LOOP ELECTRODE EXCISION  2018    WISDOM TOOTH EXTRACTION        Family History: Family History   Problem Relation Age of Onset    Hyperlipidemia Father     No Known Problems Mother     Breast cancer Maternal Grandmother         Diagnosed in lates 70s-80s      Social History:  reports that she has never smoked. She has never been exposed to tobacco smoke. She has never used smokeless tobacco.   reports that she does not currently use alcohol.   reports no history of drug use.        General ROS: Pertinent items are noted in HPI    Objective      Vital Signs Range for the last 24 hours  Temperature: Temp:  [97.8 °F (36.6 °C)] 97.8 °F (36.6 °C)   Temp Source: Temp src: Oral   BP: BP: ()/(56-83) 108/70   Pulse: Heart Rate:  [] 107   Respirations: Resp:  [18] 18   SPO2:     O2 Amount (l/min):     O2 Devices     Weight: Weight:  [80.7 kg (178 lb)] 80.7 kg (178 lb)     Physical Examination: General appearance - alert, well appearing, and in no distress    Presentation: vtx   Cervix: Exam by:     Dilation:     Effacement:     Station:    "      Fetal Heart Rate Assessment   Method:     Beats/min:     Baseline:     Varibility:     Accels:     Decels:     Tracing Category:       Uterine Assessment   Method:     Frequency (min):     Ctx Count in 10 min:     Duration:     Intensity:     Intensity by IUPC:     Resting Tone:     Resting Tone by IUPC:     Neto Units:       Laboratory Results:   Radiology Review:   Other Studies:     Assessment & Plan      Normal labor    39 weeks gestation of pregnancy        Assessment:  1.  Intrauterine pregnancy at 39w5d weeks gestation with reactive fetal status.    2.  Labor  3.  Obstetrical history significant for is non-contributory.  4.  GBS status: No results found for: \"GBSANTIGEN\"    Plan:  1.  Expect vaginal delivery.  2. Plan of care has been reviewed with patient and family  3.  Risks, benefits of treatment plan have been discussed.  4.  All questions have been answered.  5.        Hannah Noguera MD  2025  02:08 EST    Electronically signed by Hannah Noguera MD at 25 0209          Operative/Procedure Notes (last 7 days)        Hannah Noguera MD at 25 0454           [facility]   Vaginal Delivery Note    Patient Name: Ariane Vasquez  : 1995  MRN: [id number]    Date of Delivery: 2025    Diagnosis     Pre & Post-Delivery:  Intrauterine pregnancy at 39w5d  Labor status: Spontaneous Onset of Labor    Normal labor    39 weeks gestation of pregnancy             Problem List    Transfer to Postpartum     Review the Delivery Report for details.     Delivery     Delivery:      YOB: 2027   Time of Birth:  Gestational Age 4:26 AM  39w5d     Anesthesia: Epidural    Delivering clinician: Hannah Noguera   Forceps?   No   Vacuum? No    Shoulder dystocia present: No        Delivery narrative: Patient pushed for 3 hours.  Vertex was transverse but rotated to occiput anterior and then progress began.  Patient crowned and delivered over midline episiotomy under " "epidural a female.  Loose nuchal cord released off the perineum baby delivered crying Cord milked cord cut and clamped baby handed off to nurses crying.  Cord blood obtained placenta expressed uterus explored.  A midline episiotomy which was about 5 cm long was closed with 2-0 chromic running lock stitch.        Infant     Findings: female infant     Infant observations: Weight: 3990 g (8 lb 12.7 oz)  Length: 19 in  Observations/Comments:        Apgars: 8  @ 1 minute /    9  @ 5 minutes   Infant Name:      Placenta & Cord         Placenta delivered  Spontaneous at   2025  4:33 AM    Cord:   present.   Nuchal Cord?  yes; Number of nuchal loops present:      Cord blood obtained: Yes   Cord gases obtained:      Cord gas results: Venous:  No results found for: \"PHCVEN\", \"BECVEN\"    Arterial:  No results found for: \"PHCART\", \"BECART\"     Repair     Episiotomy: Median    Yes  Suture used for repair: 2-0 chromic gut    Lacerations: No   Estimated Blood Loss: Est. Blood Loss (mL): 150 mL (Filed from Delivery Summary) (25 2236)     Quantitative Blood Loss:  150        Complications     none    Disposition     Mother to Mother Baby/Postpartum  in stable condition currently.  Baby to NBN  in stable condition currently.    Hannah Noguera MD  25  04:54 EST          Electronically signed by Hannah Noguera MD at 25 0456          Physician Progress Notes (last 7 days)        Serena Crowell APRN at 25 0931           [place]  Ariane Phippscher  : 1995  MRN: [id number]  CSN: [id number]    Hospital Day: 4    Postpartum Day #2  Subjective     CC: hospital follow-up    Her pain is well controlled.  Vaginal bleeding is less than a normal period. Her baby is staying an extra night due to jaundice and bili light therapy. She has not had a bowel movement yet.     Objective     Min/max vitals past 24 hours:   Temp  Min: 97.9 °F (36.6 °C)  Max: 98.2 °F (36.8 °C)  BP  Min: 103/64  Max: " 120/71  Pulse  Min: 59  Max: 67  Resp  Min: 16  Max: 16        General: well developed; well nourished  no acute distress   Abdomen: fundus firm and non-tender   Pelvic: Not performed   Ext: Calves NT     Results from last 7 days   Lab Units 02/15/25  03425   WBC 10*3/mm3 13.18* 10.81*   HEMOGLOBIN g/dL 13.1 14.8   HEMATOCRIT % 39.3 42.1   PLATELETS 10*3/mm3 167 161     Lab Results   Component Value Date    RH Positive 2025    HEPBSAG Negative 2024     Results from last 7 days   Lab Units 25   TREPONEMA PALLIDUM AB TOTAL  Non-Reactive           Assessment   Postpartum Day #2 S/P vaginal delivery  Doing well    Plan   Discharge to home  Advised no tampons or intercourse for 6 weeks.  D/C questions all answered  Follow-up appointment in 6 week(s)    CATHERINE Cerda  2025  09:31 EST                  Electronically signed by Serena Crowell APRN at 25 1227       Serena Crowell APRN at 02/15/25 0926          [facility]  Ariane Vasquez  : 1995  MRN: [id number]  CSN: [id number]    Hospital Day: 3    Postpartum Day #1  Subjective     CC: hospital follow-up    Her pain is well controlled.  Vaginal bleeding is less than a normal period.  She is voiding normally. She has not had a bowel movement yet.     Objective     Min/max vitals past 24 hours:   Temp  Min: 97.6 °F (36.4 °C)  Max: 98 °F (36.7 °C)  BP  Min: 106/69  Max: 113/69  Pulse  Min: 69  Max: 78  Resp  Min: 16  Max: 18        Abdomen: soft, nontender; bowel sounds normal; no masses   fundus firm and nontender   Pelvic: deferred     Results from last 7 days   Lab Units 02/15/25  0344 02/13/25  1930   WBC 10*3/mm3 13.18* 10.81*   HEMOGLOBIN g/dL 13.1 14.8   HEMATOCRIT % 39.3 42.1   PLATELETS 10*3/mm3 167 161     Lab Results   Component Value Date    RH Positive 2025    HEPBSAG Negative 2024     Results from last 7 days   Lab Units 25   TREPONEMA PALLIDUM AB TOTAL   Non-Reactive           Assessment   Postpartum Day #1 S/P vaginal delivery  Doing well    Plan   Continue routine postpartum care  Anticipate d/c home tomorrow    CATHERINE Cerda  2/15/2025  09:26 EST            Electronically signed by Serena Crowell APRN at 02/15/25 1132          Discharge Summary        Serena Crowell APRN at 25 1228          Discharge Summary     Saleem Vasquez  : 1995  MRN: [id number]  CSN: [id number]    Date of Admission: 2025   Date of Discharge:  2025   Delivering Physician: Hannah Noguera       Admission Diagnosis: Currently pregnant [Z34.90]   Discharge Diagnosis: Same as above plus  Pregnancy at 39w5d - delivered       Procedures: 2025 - Vaginal, Spontaneous      Hospital Course  Patient is a 30 y.o.  who at 39w5d had a vaginal birth.  Her postpartum course was without complications.  On PPD # 2 she was ready for discharge.  She had normal lochia and pain well controlled with oral medications. Her baby is staying an extra night under phototherapy for jaundice.     Infant  female fetus weighing 3990 g (8 lb 12.7 oz)  Apgars -  8 @ 1 minute /  9 @ 5 minutes.    Discharge labs  Lab Results   Component Value Date    WBC 13.18 (H) 02/15/2025    HGB 13.1 02/15/2025    HCT 39.3 02/15/2025     02/15/2025       Discharge Medications     Discharge Medications        ASK your doctor about these medications        Instructions Start Date   cetirizine 10 MG tablet  Commonly known as: zyrTEC   10 mg, Daily      Fish Oil 300 MG capsule       fluticasone 0.05 % cream  Commonly known as: CUTIVATE   APPLY TO AFFECTED AREA TWICE DAILY FOR 2 WEEKS, THEN TAKE A 1 WEEK BREAK. REPEAT AS NEEDED      PRENATAL 1 + IRON PO   Take  by mouth.      tacrolimus 0.1 % ointment  Commonly known as: PROTOPIC   apply to affected area twice a day      Unisom SleepTabs 25 MG tablet  Generic drug: doxylamine                Discharge Disposition    Condition  on Discharge: good   Follow-up: 6 weeks with CATHERINE Oneal  2/16/2025    Electronically signed by Serena Crowell APRN at 02/16/25 8609

## 2025-02-17 NOTE — OUTREACH NOTE
Maternal Screening Survey      Flowsheet Row Responses   Eligibility Eligible   Prep survey completed? Yes   Facility patient discharged from? [facility] - Registered Nurse

## 2025-02-18 ENCOUNTER — APPOINTMENT (OUTPATIENT)
Dept: GENERAL RADIOLOGY | Facility: HOSPITAL | Age: 30
End: 2025-02-18
Payer: OTHER GOVERNMENT

## 2025-02-18 ENCOUNTER — APPOINTMENT (OUTPATIENT)
Dept: CT IMAGING | Facility: HOSPITAL | Age: 30
End: 2025-02-18
Payer: OTHER GOVERNMENT

## 2025-02-18 ENCOUNTER — HOSPITAL ENCOUNTER (EMERGENCY)
Facility: HOSPITAL | Age: 30
Discharge: HOME | End: 2025-02-19
Attending: EMERGENCY MEDICINE | Admitting: EMERGENCY MEDICINE
Payer: OTHER GOVERNMENT

## 2025-02-18 DIAGNOSIS — R07.9 NONSPECIFIC CHEST PAIN: Primary | ICD-10-CM

## 2025-02-18 DIAGNOSIS — R00.1 SINUS BRADYCARDIA: ICD-10-CM

## 2025-02-18 LAB
ALBUMIN SERPL-MCNC: 3.4 G/DL (ref 3.5–5.2)
ALBUMIN/GLOB SERPL: 1.3 G/DL
ALP SERPL-CCNC: 165 U/L (ref 39–117)
ALT SERPL W P-5'-P-CCNC: 21 U/L (ref 1–33)
ANION GAP SERPL CALCULATED.3IONS-SCNC: 8 MMOL/L (ref 5–15)
AST SERPL-CCNC: 24 U/L (ref 1–32)
BASOPHILS # BLD AUTO: 0.04 10*3/MM3 (ref 0–0.2)
BASOPHILS NFR BLD AUTO: 0.5 % (ref 0–1.5)
BILIRUB SERPL-MCNC: 0.7 MG/DL (ref 0–1.2)
BUN SERPL-MCNC: 13 MG/DL (ref 6–20)
BUN/CREAT SERPL: 18.3 (ref 7–25)
CALCIUM SPEC-SCNC: 9.2 MG/DL (ref 8.6–10.5)
CHLORIDE SERPL-SCNC: 107 MMOL/L (ref 98–107)
CO2 SERPL-SCNC: 23 MMOL/L (ref 22–29)
CREAT SERPL-MCNC: 0.71 MG/DL (ref 0.57–1)
DEPRECATED RDW RBC AUTO: 42.7 FL (ref 37–54)
EGFRCR SERPLBLD CKD-EPI 2021: 116 ML/MIN/1.73
EOSINOPHIL # BLD AUTO: 0.08 10*3/MM3 (ref 0–0.4)
EOSINOPHIL NFR BLD AUTO: 0.9 % (ref 0.3–6.2)
ERYTHROCYTE [DISTWIDTH] IN BLOOD BY AUTOMATED COUNT: 12.2 % (ref 12.3–15.4)
GEN 5 1HR TROPONIN T REFLEX: 6 NG/L
GLOBULIN UR ELPH-MCNC: 2.7 GM/DL
GLUCOSE SERPL-MCNC: 132 MG/DL (ref 65–99)
HCT VFR BLD AUTO: 39.9 % (ref 34–46.6)
HGB BLD-MCNC: 13.7 G/DL (ref 12–15.9)
HOLD SPECIMEN: NORMAL
IMM GRANULOCYTES # BLD AUTO: 0.06 10*3/MM3 (ref 0–0.05)
IMM GRANULOCYTES NFR BLD AUTO: 0.7 % (ref 0–0.5)
LIPASE SERPL-CCNC: 25 U/L (ref 13–60)
LYMPHOCYTES # BLD AUTO: 1.93 10*3/MM3 (ref 0.7–3.1)
LYMPHOCYTES NFR BLD AUTO: 22.9 % (ref 19.6–45.3)
MCH RBC QN AUTO: 32.8 PG (ref 26.6–33)
MCHC RBC AUTO-ENTMCNC: 34.3 G/DL (ref 31.5–35.7)
MCV RBC AUTO: 95.5 FL (ref 79–97)
MONOCYTES # BLD AUTO: 0.46 10*3/MM3 (ref 0.1–0.9)
MONOCYTES NFR BLD AUTO: 5.5 % (ref 5–12)
NEUTROPHILS NFR BLD AUTO: 5.86 10*3/MM3 (ref 1.7–7)
NEUTROPHILS NFR BLD AUTO: 69.5 % (ref 42.7–76)
NRBC BLD AUTO-RTO: 0 /100 WBC (ref 0–0.2)
NT-PROBNP SERPL-MCNC: 568.9 PG/ML (ref 0–450)
PLATELET # BLD AUTO: 218 10*3/MM3 (ref 140–450)
PMV BLD AUTO: 10.3 FL (ref 6–12)
POTASSIUM SERPL-SCNC: 3.7 MMOL/L (ref 3.5–5.2)
PROT SERPL-MCNC: 6.1 G/DL (ref 6–8.5)
QT INTERVAL: 420 MS
QTC INTERVAL: 408 MS
RBC # BLD AUTO: 4.18 10*6/MM3 (ref 3.77–5.28)
SODIUM SERPL-SCNC: 138 MMOL/L (ref 136–145)
TROPONIN T NUMERIC DELTA: -2 NG/L
TROPONIN T SERPL HS-MCNC: 8 NG/L
WBC NRBC COR # BLD AUTO: 8.43 10*3/MM3 (ref 3.4–10.8)
WHOLE BLOOD HOLD COAG: NORMAL
WHOLE BLOOD HOLD SPECIMEN: NORMAL

## 2025-02-18 PROCEDURE — 84484 ASSAY OF TROPONIN QUANT: CPT | Performed by: EMERGENCY MEDICINE

## 2025-02-18 PROCEDURE — 93010 ELECTROCARDIOGRAM REPORT: CPT | Performed by: INTERNAL MEDICINE

## 2025-02-18 PROCEDURE — 83690 ASSAY OF LIPASE: CPT | Performed by: EMERGENCY MEDICINE

## 2025-02-18 PROCEDURE — 25510000001 IOPAMIDOL PER 1 ML: Performed by: EMERGENCY MEDICINE

## 2025-02-18 PROCEDURE — 99285 EMERGENCY DEPT VISIT HI MDM: CPT

## 2025-02-18 PROCEDURE — 93005 ELECTROCARDIOGRAM TRACING: CPT | Performed by: EMERGENCY MEDICINE

## 2025-02-18 PROCEDURE — 71275 CT ANGIOGRAPHY CHEST: CPT

## 2025-02-18 PROCEDURE — 71045 X-RAY EXAM CHEST 1 VIEW: CPT

## 2025-02-18 PROCEDURE — 85025 COMPLETE CBC W/AUTO DIFF WBC: CPT | Performed by: EMERGENCY MEDICINE

## 2025-02-18 PROCEDURE — 80053 COMPREHEN METABOLIC PANEL: CPT | Performed by: EMERGENCY MEDICINE

## 2025-02-18 PROCEDURE — 93005 ELECTROCARDIOGRAM TRACING: CPT

## 2025-02-18 PROCEDURE — 83880 ASSAY OF NATRIURETIC PEPTIDE: CPT | Performed by: EMERGENCY MEDICINE

## 2025-02-18 PROCEDURE — 36415 COLL VENOUS BLD VENIPUNCTURE: CPT

## 2025-02-18 RX ORDER — ASPIRIN 81 MG/1
324 TABLET, CHEWABLE ORAL ONCE
Status: COMPLETED | OUTPATIENT
Start: 2025-02-18 | End: 2025-02-18

## 2025-02-18 RX ORDER — SODIUM CHLORIDE 0.9 % (FLUSH) 0.9 %
10 SYRINGE (ML) INJECTION AS NEEDED
Status: DISCONTINUED | OUTPATIENT
Start: 2025-02-18 | End: 2025-02-19 | Stop reason: HOSPADM

## 2025-02-18 RX ORDER — IOPAMIDOL 755 MG/ML
100 INJECTION, SOLUTION INTRAVASCULAR
Status: COMPLETED | OUTPATIENT
Start: 2025-02-19 | End: 2025-02-18

## 2025-02-18 RX ADMIN — ASPIRIN 81 MG 324 MG: 81 TABLET ORAL at 21:47

## 2025-02-18 RX ADMIN — IOPAMIDOL 75 ML: 755 INJECTION, SOLUTION INTRAVENOUS at 23:58

## 2025-02-19 ENCOUNTER — OFFICE VISIT (OUTPATIENT)
Dept: CARDIOLOGY | Facility: HOSPITAL | Age: 30
End: 2025-02-19
Payer: OTHER GOVERNMENT

## 2025-02-19 ENCOUNTER — TELEPHONE (OUTPATIENT)
Dept: OBSTETRICS AND GYNECOLOGY | Facility: CLINIC | Age: 30
End: 2025-02-19
Payer: OTHER GOVERNMENT

## 2025-02-19 ENCOUNTER — HOSPITAL ENCOUNTER (INPATIENT)
Facility: HOSPITAL | Age: 30
LOS: 2 days | Discharge: HOME | End: 2025-02-22
Attending: OBSTETRICS & GYNECOLOGY | Admitting: OBSTETRICS & GYNECOLOGY
Payer: OTHER GOVERNMENT

## 2025-02-19 ENCOUNTER — HOSPITAL ENCOUNTER (OUTPATIENT)
Dept: CARDIOLOGY | Facility: HOSPITAL | Age: 30
Discharge: HOME | End: 2025-02-19
Payer: OTHER GOVERNMENT

## 2025-02-19 ENCOUNTER — HOSPITAL ENCOUNTER (OUTPATIENT)
Dept: CARDIOLOGY | Facility: HOSPITAL | Age: 30
Discharge: HOME | End: 2025-02-19
Admitting: NURSE PRACTITIONER
Payer: COMMERCIAL

## 2025-02-19 VITALS
OXYGEN SATURATION: 97 % | WEIGHT: 167.33 LBS | BODY MASS INDEX: 26.26 KG/M2 | TEMPERATURE: 97.8 F | HEART RATE: 48 BPM | RESPIRATION RATE: 18 BRPM | SYSTOLIC BLOOD PRESSURE: 158 MMHG | DIASTOLIC BLOOD PRESSURE: 99 MMHG | HEIGHT: 67 IN

## 2025-02-19 VITALS
HEART RATE: 52 BPM | SYSTOLIC BLOOD PRESSURE: 138 MMHG | OXYGEN SATURATION: 99 % | RESPIRATION RATE: 18 BRPM | BODY MASS INDEX: 25.96 KG/M2 | HEIGHT: 67 IN | DIASTOLIC BLOOD PRESSURE: 84 MMHG | WEIGHT: 165.38 LBS

## 2025-02-19 DIAGNOSIS — R00.2 PALPITATIONS: ICD-10-CM

## 2025-02-19 DIAGNOSIS — R07.89 OTHER CHEST PAIN: ICD-10-CM

## 2025-02-19 DIAGNOSIS — R03.0 ELEVATED BLOOD PRESSURE READING IN OFFICE WITHOUT DIAGNOSIS OF HYPERTENSION: ICD-10-CM

## 2025-02-19 DIAGNOSIS — R07.89 OTHER CHEST PAIN: Primary | ICD-10-CM

## 2025-02-19 LAB
ALBUMIN SERPL-MCNC: 3.6 G/DL (ref 3.5–5.2)
ALBUMIN/GLOB SERPL: 1.6 G/DL
ALP SERPL-CCNC: 155 U/L (ref 39–117)
ALT SERPL W P-5'-P-CCNC: 20 U/L (ref 1–33)
ANION GAP SERPL CALCULATED.3IONS-SCNC: 16 MMOL/L (ref 5–15)
AST SERPL-CCNC: 20 U/L (ref 1–32)
BACTERIA UR QL AUTO: ABNORMAL /HPF
BILIRUB SERPL-MCNC: 0.8 MG/DL (ref 0–1.2)
BILIRUB UR QL STRIP: NEGATIVE
BUN SERPL-MCNC: 11 MG/DL (ref 6–20)
BUN/CREAT SERPL: 17.2 (ref 7–25)
CALCIUM SPEC-SCNC: 8.9 MG/DL (ref 8.6–10.5)
CHLORIDE SERPL-SCNC: 107 MMOL/L (ref 98–107)
CLARITY UR: CLEAR
CO2 SERPL-SCNC: 18 MMOL/L (ref 22–29)
COLOR UR: YELLOW
CREAT SERPL-MCNC: 0.64 MG/DL (ref 0.57–1)
DEPRECATED RDW RBC AUTO: 40.8 FL (ref 37–54)
EGFRCR SERPLBLD CKD-EPI 2021: 120.6 ML/MIN/1.73
ERYTHROCYTE [DISTWIDTH] IN BLOOD BY AUTOMATED COUNT: 12 % (ref 12.3–15.4)
EXPIRATION DATE: ABNORMAL
GLOBULIN UR ELPH-MCNC: 2.3 GM/DL
GLUCOSE SERPL-MCNC: 84 MG/DL (ref 65–99)
GLUCOSE UR STRIP-MCNC: NEGATIVE MG/DL
HCT VFR BLD AUTO: 39.8 % (ref 34–46.6)
HGB BLD-MCNC: 14 G/DL (ref 12–15.9)
HGB UR QL STRIP.AUTO: ABNORMAL
HYALINE CASTS UR QL AUTO: ABNORMAL /LPF
KETONES UR QL STRIP: ABNORMAL
LEUKOCYTE ESTERASE UR QL STRIP.AUTO: ABNORMAL
Lab: ABNORMAL
MCH RBC QN AUTO: 32.6 PG (ref 26.6–33)
MCHC RBC AUTO-ENTMCNC: 35.2 G/DL (ref 31.5–35.7)
MCV RBC AUTO: 92.6 FL (ref 79–97)
NITRITE UR QL STRIP: NEGATIVE
PH UR STRIP.AUTO: 5.5 [PH] (ref 5–8)
PLATELET # BLD AUTO: 251 10*3/MM3 (ref 140–450)
PMV BLD AUTO: 10.3 FL (ref 6–12)
POTASSIUM SERPL-SCNC: 3.6 MMOL/L (ref 3.5–5.2)
PROT SERPL-MCNC: 5.9 G/DL (ref 6–8.5)
PROT UR QL STRIP: ABNORMAL
PROT UR STRIP-MCNC: ABNORMAL MG/DL
RBC # BLD AUTO: 4.3 10*6/MM3 (ref 3.77–5.28)
RBC # UR STRIP: ABNORMAL /HPF
REF LAB TEST METHOD: ABNORMAL
SODIUM SERPL-SCNC: 141 MMOL/L (ref 136–145)
SP GR UR STRIP: 1.02 (ref 1–1.03)
SQUAMOUS #/AREA URNS HPF: ABNORMAL /HPF
UROBILINOGEN UR QL STRIP: ABNORMAL
WBC # UR STRIP: ABNORMAL /HPF
WBC NRBC COR # BLD AUTO: 7.64 10*3/MM3 (ref 3.4–10.8)

## 2025-02-19 PROCEDURE — 36415 COLL VENOUS BLD VENIPUNCTURE: CPT | Performed by: OBSTETRICS & GYNECOLOGY

## 2025-02-19 PROCEDURE — 25810000003 LACTATED RINGERS PER 1000 ML: Performed by: OBSTETRICS & GYNECOLOGY

## 2025-02-19 PROCEDURE — 25010000002 ONDANSETRON PER 1 MG: Performed by: OBSTETRICS & GYNECOLOGY

## 2025-02-19 PROCEDURE — 85027 COMPLETE CBC AUTOMATED: CPT | Performed by: OBSTETRICS & GYNECOLOGY

## 2025-02-19 PROCEDURE — 93242 EXT ECG>48HR<7D RECORDING: CPT

## 2025-02-19 PROCEDURE — 93246 EXT ECG>7D<15D RECORDING: CPT

## 2025-02-19 PROCEDURE — 80053 COMPREHEN METABOLIC PANEL: CPT | Performed by: OBSTETRICS & GYNECOLOGY

## 2025-02-19 PROCEDURE — 99222 1ST HOSP IP/OBS MODERATE 55: CPT | Performed by: OBSTETRICS & GYNECOLOGY

## 2025-02-19 PROCEDURE — 25010000002 KETOROLAC TROMETHAMINE PER 15 MG: Performed by: OBSTETRICS & GYNECOLOGY

## 2025-02-19 PROCEDURE — 81001 URINALYSIS AUTO W/SCOPE: CPT | Performed by: OBSTETRICS & GYNECOLOGY

## 2025-02-19 PROCEDURE — 81002 URINALYSIS NONAUTO W/O SCOPE: CPT | Performed by: OBSTETRICS & GYNECOLOGY

## 2025-02-19 PROCEDURE — 93225 XTRNL ECG REC<48 HRS REC: CPT

## 2025-02-19 RX ORDER — LABETALOL HYDROCHLORIDE 5 MG/ML
20-80 INJECTION, SOLUTION INTRAVENOUS
Status: CANCELLED | OUTPATIENT
Start: 2025-02-19

## 2025-02-19 RX ORDER — SODIUM CHLORIDE 0.9 % (FLUSH) 0.9 %
10 SYRINGE (ML) INJECTION AS NEEDED
Status: CANCELLED | OUTPATIENT
Start: 2025-02-19

## 2025-02-19 RX ORDER — SODIUM CHLORIDE, SODIUM LACTATE, POTASSIUM CHLORIDE, CALCIUM CHLORIDE 600; 310; 30; 20 MG/100ML; MG/100ML; MG/100ML; MG/100ML
75 INJECTION, SOLUTION INTRAVENOUS CONTINUOUS
Status: DISCONTINUED | OUTPATIENT
Start: 2025-02-19 | End: 2025-02-19

## 2025-02-19 RX ORDER — MAGNESIUM SULFATE HEPTAHYDRATE 40 MG/ML
2 INJECTION, SOLUTION INTRAVENOUS CONTINUOUS
Status: DISCONTINUED | OUTPATIENT
Start: 2025-02-19 | End: 2025-02-22 | Stop reason: HOSPADM

## 2025-02-19 RX ORDER — SODIUM CHLORIDE 0.9 % (FLUSH) 0.9 %
10 SYRINGE (ML) INJECTION AS NEEDED
Status: DISCONTINUED | OUTPATIENT
Start: 2025-02-19 | End: 2025-02-22 | Stop reason: HOSPADM

## 2025-02-19 RX ORDER — SODIUM CHLORIDE 0.9 % (FLUSH) 0.9 %
10 SYRINGE (ML) INJECTION EVERY 12 HOURS SCHEDULED
Status: CANCELLED | OUTPATIENT
Start: 2025-02-19

## 2025-02-19 RX ORDER — DEXTROSE MONOHYDRATE AND SODIUM CHLORIDE 5; .45 G/100ML; G/100ML
75 INJECTION, SOLUTION INTRAVENOUS CONTINUOUS
Status: DISCONTINUED | OUTPATIENT
Start: 2025-02-19 | End: 2025-02-22 | Stop reason: HOSPADM

## 2025-02-19 RX ORDER — ACETAMINOPHEN 500 MG
1000 TABLET ORAL EVERY 6 HOURS PRN
Status: CANCELLED | OUTPATIENT
Start: 2025-02-19

## 2025-02-19 RX ORDER — NIFEDIPINE 10 MG/1
10-20 CAPSULE ORAL
Status: CANCELLED | OUTPATIENT
Start: 2025-02-19

## 2025-02-19 RX ORDER — SODIUM CHLORIDE 0.9 % (FLUSH) 0.9 %
10 SYRINGE (ML) INJECTION EVERY 12 HOURS SCHEDULED
Status: DISCONTINUED | OUTPATIENT
Start: 2025-02-19 | End: 2025-02-22 | Stop reason: HOSPADM

## 2025-02-19 RX ORDER — IBUPROFEN 600 MG/1
600 TABLET, FILM COATED ORAL EVERY 6 HOURS PRN
Status: CANCELLED | OUTPATIENT
Start: 2025-02-19

## 2025-02-19 RX ORDER — ONDANSETRON 2 MG/ML
4 INJECTION INTRAMUSCULAR; INTRAVENOUS EVERY 6 HOURS PRN
Status: DISCONTINUED | OUTPATIENT
Start: 2025-02-19 | End: 2025-02-22 | Stop reason: HOSPADM

## 2025-02-19 RX ORDER — KETOROLAC TROMETHAMINE 30 MG/ML
30 INJECTION, SOLUTION INTRAMUSCULAR; INTRAVENOUS ONCE
Status: COMPLETED | OUTPATIENT
Start: 2025-02-19 | End: 2025-02-19

## 2025-02-19 RX ORDER — CALCIUM GLUCONATE 94 MG/ML
1 INJECTION, SOLUTION INTRAVENOUS ONCE AS NEEDED
Status: DISCONTINUED | OUTPATIENT
Start: 2025-02-19 | End: 2025-02-22 | Stop reason: HOSPADM

## 2025-02-19 RX ORDER — NIFEDIPINE 10 MG/1
10 CAPSULE ORAL ONCE
Status: COMPLETED | OUTPATIENT
Start: 2025-02-19 | End: 2025-02-19

## 2025-02-19 RX ORDER — NIFEDIPINE 30 MG/1
30 TABLET, EXTENDED RELEASE ORAL
Status: CANCELLED | OUTPATIENT
Start: 2025-02-19

## 2025-02-19 RX ORDER — HYDRALAZINE HYDROCHLORIDE 20 MG/ML
5-10 INJECTION INTRAMUSCULAR; INTRAVENOUS
Status: CANCELLED | OUTPATIENT
Start: 2025-02-19

## 2025-02-19 RX ORDER — SODIUM CHLORIDE 9 MG/ML
40 INJECTION, SOLUTION INTRAVENOUS AS NEEDED
Status: CANCELLED | OUTPATIENT
Start: 2025-02-19

## 2025-02-19 RX ORDER — DEXTROSE MONOHYDRATE AND SODIUM CHLORIDE 5; .45 G/100ML; G/100ML
75 INJECTION, SOLUTION INTRAVENOUS CONTINUOUS
Status: CANCELLED | OUTPATIENT
Start: 2025-02-19 | End: 2025-02-21

## 2025-02-19 RX ORDER — ONDANSETRON 2 MG/ML
4 INJECTION INTRAMUSCULAR; INTRAVENOUS EVERY 6 HOURS PRN
Status: CANCELLED | OUTPATIENT
Start: 2025-02-19

## 2025-02-19 RX ADMIN — KETOROLAC TROMETHAMINE 30 MG: 30 INJECTION, SOLUTION INTRAMUSCULAR; INTRAVENOUS at 23:02

## 2025-02-19 RX ADMIN — ONDANSETRON 4 MG: 2 INJECTION INTRAMUSCULAR; INTRAVENOUS at 21:38

## 2025-02-19 RX ADMIN — DEXTROSE AND SODIUM CHLORIDE 75 ML/HR: 5; 450 INJECTION, SOLUTION INTRAVENOUS at 22:00

## 2025-02-19 RX ADMIN — NIFEDIPINE 10 MG: 10 CAPSULE ORAL at 21:17

## 2025-02-19 RX ADMIN — Medication 10 ML: at 22:02

## 2025-02-19 RX ADMIN — SODIUM CHLORIDE, SODIUM LACTATE, POTASSIUM CHLORIDE, CALCIUM CHLORIDE 75 ML/HR: 20; 30; 600; 310 INJECTION, SOLUTION INTRAVENOUS at 21:42

## 2025-02-19 NOTE — TELEPHONE ENCOUNTER
Pt called and stated that she was told to call the [facility] office and talk to paula so she can talk to ria directly but the [facility] office is closed for the day. Pt would like to know if she can get a call from paula later today

## 2025-02-19 NOTE — PROGRESS NOTES
[facility] Heart Monitor Documentation    Ariane Vasquez  1995  [id number]  02/19/25      [] ZIO XT Patch  Model [id number] Prescribed for  Days    Serial Number: (N + 9 Digits) N   Apply-By Date on Box:   USPS Tracking Number:   USPS Tracking        [] [employer] MINI PLUS Mobile Cardiac Telemetry  Model BGMINIPLUS Prescribed for  Days    Serial Number: (BGM + 7 Digits) BGM  Shipped-By Date on Box:   UPS Tracking Number: 1Z  UPS Tracking      [x] [employer] MINI Holter Monitor  Model BGMINIEL Prescribed for 14 Days    Serial Number: (7 Digits) [id number]  Shipped-By Date on Box: 2/10/25  UPS Tracking Number: [id number]  UPS Tracking        This monitor was applied to the patient's chest and checked for proper functioning.  Ms. Ariane Vasquez was instructed in the proper use of this monitor.  She was given the opportunity to ask questions and left the office with the device 's instruction manual.    Xi Christianson MA, 15:38 EST, 02/19/25                  [facility]

## 2025-02-19 NOTE — TELEPHONE ENCOUNTER
Pt was unable to check BP but cardiology called her and they were able to get her an appointment today at 3:15pm. Informed pt that Dr. Prince wants to know her BP and if it is still elevated, she would like pt to be seen. Pt states she will let us know of BP. Pt v/u.

## 2025-02-19 NOTE — TELEPHONE ENCOUNTER
Pt is s/p  on . Pt states she went to ED yesterday for chest pain. Pt states referral for cardiology was put in. Pt wants to let Dr. Prince know and see if she needs to see Dr. Prince and if she can expedite the referral. Message sent to SUJIT Garcia RN to notify Dr. Prince since Dr. Prince is in [place].

## 2025-02-19 NOTE — TELEPHONE ENCOUNTER
Patient called and wanted to talk to a nurse about an update on her, she went to the er yesterday

## 2025-02-19 NOTE — ED PROVIDER NOTES
Subjective   History of Present Illness  This is a 30-year-old female presented to the emergency department some chest discomfort.  The patient states that she is 4 days postpartum.  She was doing fine status post delivery.  She states that today she started getting some substernal chest pain.  It was earlier in the day.  Felt like a tightness underneath her chest.  She was concerned because her heart rate was also low as well.  Patient never had issues like this before.  Does not have any associated shortness of breath, no cough or palpitations.  No fevers or chills.  No headache or change in vision.  No focal weakness.  No abdominal pain or vomiting    History provided by:  Patient   used: No        Review of Systems   Constitutional:  Negative for chills and fever.   HENT:  Negative for congestion, ear pain and sore throat.    Eyes:  Negative for visual disturbance.   Respiratory:  Negative for shortness of breath.    Cardiovascular:  Positive for chest pain.   Gastrointestinal:  Negative for abdominal pain.   Genitourinary:  Negative for difficulty urinating.   Musculoskeletal:  Negative for arthralgias.   Skin:  Negative for rash.   Neurological:  Negative for dizziness, weakness and numbness.   Psychiatric/Behavioral:  Negative for agitation.        Past Medical History:   Diagnosis Date    Abnormal Pap smear of cervix     HPV (human papilloma virus) infection 2018    S/p colposcopy and leep, All normal paps since that time    Pregnancy 7/11/2024       Allergies   Allergen Reactions    Sulfa Antibiotics Rash       Past Surgical History:   Procedure Laterality Date    APPENDECTOMY      CERVICAL BIOPSY  W/ LOOP ELECTRODE EXCISION  2018    WISDOM TOOTH EXTRACTION         Family History   Problem Relation Age of Onset    Hyperlipidemia Father     No Known Problems Mother     Breast cancer Maternal Grandmother         Diagnosed in lates 70s-80s       Social History     Socioeconomic History     Marital status:      Spouse name: Nino   Tobacco Use    Smoking status: Never     Passive exposure: Never    Smokeless tobacco: Never   Vaping Use    Vaping status: Never Used   Substance and Sexual Activity    Alcohol use: Not Currently     Comment: Prior social/occasional use    Drug use: Never    Sexual activity: Yes     Partners: Male     Birth control/protection: None           Objective   Physical Exam  Vitals and nursing note reviewed.   Constitutional:       General: She is not in acute distress.     Appearance: She is not ill-appearing or toxic-appearing.   HENT:      Mouth/Throat:      Pharynx: No posterior oropharyngeal erythema.   Eyes:      Conjunctiva/sclera: Conjunctivae normal.      Pupils: Pupils are equal, round, and reactive to light.   Cardiovascular:      Rate and Rhythm: Normal rate and regular rhythm.   Pulmonary:      Effort: Pulmonary effort is normal. No respiratory distress.   Abdominal:      General: Abdomen is flat. There is no distension.      Palpations: There is no mass.      Tenderness: There is no abdominal tenderness. There is no guarding or rebound.   Musculoskeletal:         General: No deformity. Normal range of motion.   Skin:     General: Skin is warm.      Findings: No rash.   Neurological:      General: No focal deficit present.      Mental Status: She is alert and oriented to person, place, and time.      Motor: No weakness.         ECG 12 Lead ED Triage Standing Order; Chest Pain      Date/Time: 2/18/2025 10:34 PM    Performed by: Grzegorz Carrasco MD  Authorized by: Grzegorz Carrasco MD  Interpreted by ED physician  Comparison: not compared with previous ECG   Previous ECG: no previous ECG available  Rhythm: sinus bradycardia  Rate: bradycardic  BPM: 57  QRS axis: normal  Conduction: conduction normal  Other findings comments: Nonspecific ST changes  Clinical impression: non-specific ECG  Comments: Interpretation:  EKG was directly visualized by myself,  interpretations as documented in hospital course.    ECG 12 Lead      Date/Time: 2/18/2025 11:30 PM    Performed by: Grzegorz Carrasco MD  Authorized by: Grzegorz Carrasco MD  Interpreted by ED physician  Comparison: compared with previous ECG   Similar to previous ECG  Rhythm: sinus bradycardia  Rate: bradycardic  BPM: 49  QRS axis: normal  Conduction: conduction normal  Other findings comments: Nonspecific ST changes  Clinical impression: non-specific ECG and low voltage  Comments: Interpretation:  EKG was directly visualized by myself, interpretations as documented in hospital course.               ED Course  ED Course as of 02/19/25 0030   Tue Feb 18, 2025 2235 Noninvasive MAP (mmHg): 108 [JK]   2235 Temp: 97.8 °F (36.6 °C) [JK]   2235 Temp src: Oral [JK]   2235 Heart Rate: 74 [JK]   2235 Resp: 18 [JK]   2235 SpO2: 99 %  Interpretation:  Patient's repeat vitals, telemetry tracing, and pulse oximetry tracing were directly viewed and interpreted by myself.   O2 sat 99% on room air, interpreted as normal.  Telemetry rhythm strip revealed a rate of 74 bpm, interpreted as normal sinus rhythm [JK]   2236 Comprehensive Metabolic Panel(!) [JK]   2236 BNP(!) [JK]   2236 High Sensitivity Troponin T [JK]   2236 CBC & Differential(!)  Interpretation:  Laboratory studies were reviewed and interpreted directly by myself.  CMP unremarkable, BNP was slightly elevated at 568, lipase normal, troponin normal, CBC normal [JK]   Wed Feb 19, 2025   0027 High Sensitivity Troponin T 1Hr  Repeat troponin was normal [JK]   0027 CT Angiogram Chest [JK]   0027 XR Chest 1 View  Interpretation:  Imaging was directly visualized by myself, per my interpretations, chest x-ray and CT angiogram are unremarkable. [JK]   0027 On reevaluation, the patient's chest tightness is improved.  She does have some mild sinus bradycardia, however there is no evidence of structural heart disease at this time.  There is no evidence of cardiomegaly.  No  heart block on EKG.  This is likely physiologic sinus bradycardia related to her postpartum status.  Regardless I do feel the patient will require follow-up with cardiology.  I will place a referral for her.  If she is to have any worsening symptoms or symptomatic bradycardia she is to return to the emergency department immediately.  Verbalized understanding. [JK]   0028 I had a discussion with the patient/family regarding diagnosis, diagnostic results, treatment plan, and medications. The patient/family indicated understanding of these instructions. I spent adequate time at the bedside prior to discharge necessary to discuss the aftercare instructions, giving patient education, providing explanations of the results of our evaluations/findings, and my decision making to assure that the patient/family understand the plan of care. Time was allotted to answer questions at that time and throughout the ED course. Patient is required to maintain timely follow up, as discussed. I also discussed the potential for the development of an acute emergent condition requiring further evaluation, return to the ER, admission, or even surgical intervention.  I encouraged the patient to return to the emergency department immediately for any concerns, worsening symptoms, new complaints, or if symptoms persist and they are unable to seek follow-up in a timely fashion. The patient/family expressed understanding and agreement with this plan    Shared decision making:   After full review of the patient's clinical presentation, review of any work-up including but not limited to laboratory studies and radiology obtained, I had a discussion with the patient.  Treatment options were discussed as well as the risks, benefits and consequences.  I discussed all findings with the patient and family members if available.  During the discussion, treatment goals were understood by all as well as any misconceptions which were addressed with the  patient.  Ample time was given for any questions they may have had.  They are in agreement with the treatment plan as well as final disposition. [JK]      ED Course User Index  [JK] Grzegorz Carrasco MD                  HEART Score: 1                                      Medical Decision Making  This is a 30-year-old female presented to the emergency department some chest discomfort.  Patient is 4 days postpartum.  She apparently had some bradycardia as well as chest discomfort.  Seems atypical in nature.  EKG is nonspecific.  Otherwise hemodynamically stable.  Overall, the patient is nontoxic.  Afebrile.  IV access was established and the patient.  Placed on continuous telemetry monitoring.  Given the patient's presentation, differential is broad and will require further evaluation.  Workup initiated.      Differential diagnosis: CAD, ACS, peptic ulcer disease, dyspepsia, pneumonia, bronchitis, atypical chest pain, angina, musculoskeletal pain      Amount and/or Complexity of Data Reviewed  External Data Reviewed: labs and notes.     Details: External laboratories, imaging as well as notes were reviewed personally by myself.  All relevant studies were used to guide decision making.     Date of previous record: 2/13/2025    Source of note: Admission Record    Summary:  Patient was seen and admitted for standard vaginal delivery.  I did review basic laboratory studies on file.  Records reviewed      Labs: ordered. Decision-making details documented in ED Course.  Radiology: ordered and independent interpretation performed. Decision-making details documented in ED Course.  ECG/medicine tests: ordered and independent interpretation performed. Decision-making details documented in ED Course.    Risk  OTC drugs.  Prescription drug management.        Final diagnoses:   Nonspecific chest pain   Sinus bradycardia   Postpartum exam       ED Disposition  ED Disposition       ED Disposition   Discharge    Condition   Stable     Comment   --               [facility] CARDI[facility]  [address]  [address]  [facility] [id number] [phone]  Call in 1 day           Medication List      No changes were made to your prescriptions during this visit.            Grzegorz Carrasco MD  02/19/25 0030

## 2025-02-19 NOTE — TELEPHONE ENCOUNTER
Dr. Prince spoke with patient about blood pressure at cardiology appt. Pt should be seen in the office for appointment this week per Suresh. Pt scheduled.

## 2025-02-19 NOTE — PROGRESS NOTES
Chief Complaint  Chest Pain and Slow Heart Rate    Subjective      History of Present Illness {CC  Problem List  Visit  Diagnosis   Encounters  Notes  Medications  Labs  Result Review Imaging  Media :23}     Ariane Vasquez, 30 y.o. female with past medical history significant for abnormal Pap smear, HPV, and recent delivery on 2/19, who presents to [facility] for Chest Pain and Slow Heart Rate.  Patient presented to the ED on 2/18/2025 with a chief complaint of chest discomfort.  At that time, patient was 4 days postpartum.  She began having substernal chest pain which felt like a tightness under her chest.  Patient also became concerned because her heart rate was noted to be low in the 40s.  Blood pressure throughout ED stay noted to be elevated from 132/86 up to 158/99.  Twelve-lead EKG showed sinus bradycardia, rate 49, possible left atrial enlargement.  CTA of the chest showed no evidence of PE, and no active disease.  High-sensitivity troponin noted be negative x 2.  Patient was treated while in the ED with 324 mg of aspirin.  She is recommended to follow-up with primary care, or OB, and heart and valve for further evaluation of above-mentioned symptoms.    At time of today's evaluation, patient states her chest discomfort has improved.  She denies any exertional component to her chest pain.  She states her discomfort is fairly constant in nature.  Pain is located in the center of her chest without radiation and is described as a pressure/tightness.  There are no obvious relieving factors.  Patient also states that she has had significant headache recently.  Her headache is not improved with use of Tylenol.  Patient currently denies any dyspnea on exertion, syncope, or near syncope.  She states she has had lower extremity edema throughout her pregnancy, which has been ongoing since delivery.  She denies any specific palpitations, but states she has noted significant  "bradycardia with heart rates in the 40s.  Patient states that her blood pressure throughout her pregnancy was noted to be within normal limits.      Objective     Vital Signs:   Vitals:    02/19/25 1512 02/19/25 1513 02/19/25 1534   BP: 140/87 171/92 138/84   BP Location: Left arm Left arm Left arm   Patient Position: Standing Sitting    Cuff Size: Adult Adult    Pulse: 58 52    Resp:  18    SpO2: 99% 99%    Weight:  75 kg (165 lb 6 oz)    Height:  170.2 cm (67\")      Body mass index is 25.9 kg/m².  Physical Exam  Vitals and nursing note reviewed.   Constitutional:       Appearance: Normal appearance.   HENT:      Head: Normocephalic.   Eyes:      Pupils: Pupils are equal, round, and reactive to light.   Cardiovascular:      Rate and Rhythm: Regular rhythm. Bradycardia present.      Pulses: Normal pulses.      Heart sounds: Normal heart sounds. No murmur heard.  Pulmonary:      Effort: Pulmonary effort is normal.      Breath sounds: Normal breath sounds.   Abdominal:      General: Bowel sounds are normal.      Palpations: Abdomen is soft.   Musculoskeletal:         General: Normal range of motion.      Cervical back: Normal range of motion.      Right lower leg: No edema.      Left lower leg: No edema.   Skin:     General: Skin is warm and dry.      Capillary Refill: Capillary refill takes less than 2 seconds.   Neurological:      Mental Status: She is alert and oriented to person, place, and time.   Psychiatric:         Mood and Affect: Mood normal.         Thought Content: Thought content normal.                Data Reviewed:{ Labs  Result Review  Imaging  Med Tab  Media :23}     Lab Results   Component Value Date    WBC 7.64 02/19/2025    HGB 14.0 02/19/2025    HCT 39.8 02/19/2025    MCV 92.6 02/19/2025     02/19/2025      Lab Results   Component Value Date    GLUCOSE 84 02/19/2025    BUN 11 02/19/2025    CREATININE 0.64 02/19/2025     02/19/2025    K 3.6 02/19/2025     02/19/2025    " CALCIUM 8.9 02/19/2025    PROTEINTOT 5.9 (L) 02/19/2025    ALBUMIN 3.6 02/19/2025    ALT 20 02/19/2025    AST 20 02/19/2025    ALKPHOS 155 (H) 02/19/2025    BILITOT 0.8 02/19/2025    GLOB 2.3 02/19/2025    AGRATIO 1.6 02/19/2025    BCR 17.2 02/19/2025    ANIONGAP 16.0 (H) 02/19/2025    EGFR 120.6 02/19/2025      Lab Results   Component Value Date    TROPONINT 6 02/18/2025     ECG 12 Lead ED Triage Standing Order; Chest Pain (02/18/2025 23:16)  ECG 12 Lead ED Triage Standing Order; Chest Pain (02/18/2025 21:32)      Assessment & Plan   Assessment and Plan {CC Problem List  Visit Diagnosis  ROS  Review (Popup)  Health Maintenance  Quality  BestPractice  Medications  SmartSets  SnapShot Encounters  Media :23}     1. Other chest pain  -Chest discomfort episode described as atypical in nature  -We briefly reviewed and discussed most recent ED evaluation including EKG, CT scan, and laboratory results  -Due to lack of exertional component to patient's chest pain, will defer stress testing at this time  -Plan to proceed with 14-day Holter monitor to assess for burden and severity of bradycardia as well as any other arrhythmias which could be contributing to patient's symptoms   -We will also obtain echocardiogram to rule out structural or functional abnormalities  - Adult Transthoracic Echo Complete W/ Cont if Necessary Per Protocol; Future  - Holter Monitor - 72 Hour Up To 15 Days; Future    2. Palpitations  -Patient notes significant bradycardia recently via her watch, and with manual pulse checks  -As mentioned above, we will proceed with 14-day cardiac monitor, and echocardiogram  -Patient encouraged to stay adequately hydrated and drink at least 2 - 2.5 quarts of water daily  - Adult Transthoracic Echo Complete W/ Cont if Necessary Per Protocol; Future  - Holter Monitor - 72 Hour Up To 15 Days; Future    3. Elevated blood pressure reading in office without diagnosis of hypertension  -Blood pressure noted to  be initially elevated during today's evaluation, with mild improvement upon manual recheck  -Discussed with patient recommendation to obtain upper arm blood pressure cuff and check blood pressure at least once per day.  Her goal blood pressure would be consistently less than 140/90  -Discussed with patient that significant headache could be secondary to elevated blood pressure readings.  -Patient encouraged to reach out to either heart and valve, or OB provider if she notices consistently elevated blood pressure readings             We will plan to follow-up with patient approximately 4 weeks to discuss results of Holter monitor, and echocardiogram.  Patient may reach out prior to that time if her blood pressure is poorly controlled.      Follow Up {Instructions Charge Capture  Follow-up Communications :23}     Return in about 4 weeks (around 3/19/2025) for Palpitations, Result review.    Patient was given instructions and counseling regarding her condition or for health maintenance advice. Please see specific information pulled into the AVS if appropriate. Patient was instructed to call the [facility] with any questions, concerns, or worsening symptoms.    Dictated Utilizing Dragon Dictation   Please note that portions of this note were completed with a voice recognition program. Part of this note may be an electronic transcription/translation of spoken language to printed text using the Dragon Dictation System.

## 2025-02-20 ENCOUNTER — APPOINTMENT (OUTPATIENT)
Dept: CARDIOLOGY | Facility: HOSPITAL | Age: 30
End: 2025-02-20
Payer: OTHER GOVERNMENT

## 2025-02-20 LAB
AV MEAN PRESS GRAD SYS DOP V1V2: 7 MMHG
AV VMAX SYS DOP: 178 CM/SEC
BH CV ECHO MEAS - AO MAX PG: 12.7 MMHG
BH CV ECHO MEAS - AO ROOT DIAM: 3.1 CM
BH CV ECHO MEAS - AO V2 VTI: 33.8 CM
BH CV ECHO MEAS - AVA(I,D): 2.16 CM2
BH CV ECHO MEAS - EDV(CUBED): 91.1 ML
BH CV ECHO MEAS - EDV(MOD-SP2): 87.3 ML
BH CV ECHO MEAS - EDV(MOD-SP4): 73.6 ML
BH CV ECHO MEAS - EF(MOD-SP2): 68.8 %
BH CV ECHO MEAS - EF(MOD-SP4): 63.9 %
BH CV ECHO MEAS - ESV(CUBED): 19.7 ML
BH CV ECHO MEAS - ESV(MOD-SP2): 27.2 ML
BH CV ECHO MEAS - ESV(MOD-SP4): 26.6 ML
BH CV ECHO MEAS - FS: 40 %
BH CV ECHO MEAS - IVS/LVPW: 1.1 CM
BH CV ECHO MEAS - IVSD: 1 CM
BH CV ECHO MEAS - LA DIMENSION: 2.6 CM
BH CV ECHO MEAS - LAT PEAK E' VEL: 13.8 CM/SEC
BH CV ECHO MEAS - LV MASS(C)D: 164 GRAMS
BH CV ECHO MEAS - LV MAX PG: 5 MMHG
BH CV ECHO MEAS - LV MEAN PG: 3 MMHG
BH CV ECHO MEAS - LV V1 MAX: 112 CM/SEC
BH CV ECHO MEAS - LV V1 VTI: 23.2 CM
BH CV ECHO MEAS - LVIDD: 4.5 CM
BH CV ECHO MEAS - LVIDS: 2.7 CM
BH CV ECHO MEAS - LVOT AREA: 3.1 CM2
BH CV ECHO MEAS - LVOT DIAM: 2 CM
BH CV ECHO MEAS - LVPWD: 1 CM
BH CV ECHO MEAS - MED PEAK E' VEL: 7.7 CM/SEC
BH CV ECHO MEAS - MR MAX PG: 36.7 MMHG
BH CV ECHO MEAS - MR MAX VEL: 296.5 CM/SEC
BH CV ECHO MEAS - MV A MAX VEL: 81.2 CM/SEC
BH CV ECHO MEAS - MV DEC SLOPE: 505 CM/SEC2
BH CV ECHO MEAS - MV DEC TIME: 0.14 SEC
BH CV ECHO MEAS - MV E MAX VEL: 69.2 CM/SEC
BH CV ECHO MEAS - MV E/A: 0.85
BH CV ECHO MEAS - MV MAX PG: 4 MMHG
BH CV ECHO MEAS - MV MEAN PG: 3 MMHG
BH CV ECHO MEAS - MV P1/2T: 62.1 MSEC
BH CV ECHO MEAS - MV V2 VTI: 20.8 CM
BH CV ECHO MEAS - MVA(P1/2T): 3.5 CM2
BH CV ECHO MEAS - MVA(VTI): 3.5 CM2
BH CV ECHO MEAS - PA ACC TIME: 0.11 SEC
BH CV ECHO MEAS - PA V2 MAX: 99.5 CM/SEC
BH CV ECHO MEAS - PI END-D VEL: 86.6 CM/SEC
BH CV ECHO MEAS - RAP SYSTOLE: 3 MMHG
BH CV ECHO MEAS - RVSP: 23 MMHG
BH CV ECHO MEAS - SV(LVOT): 72.9 ML
BH CV ECHO MEAS - SV(MOD-SP2): 60.1 ML
BH CV ECHO MEAS - SV(MOD-SP4): 47 ML
BH CV ECHO MEAS - TAPSE (>1.6): 2.6 CM
BH CV ECHO MEAS - TR MAX PG: 20 MMHG
BH CV ECHO MEAS - TR MAX VEL: 217 CM/SEC
BH CV ECHO MEASUREMENTS AVERAGE E/E' RATIO: 6.44
BH CV XLRA - RV BASE: 3.8 CM
BH CV XLRA - RV LENGTH: 7.1 CM
BH CV XLRA - RV MID: 3.4 CM
BH CV XLRA - TDI S': 14 CM/SEC
LEFT ATRIUM VOLUME INDEX: 24.6 ML/M2
LV EF BIPLANE MOD: 67.3 %

## 2025-02-20 PROCEDURE — 99232 SBSQ HOSP IP/OBS MODERATE 35: CPT | Performed by: OBSTETRICS & GYNECOLOGY

## 2025-02-20 PROCEDURE — 25010000002 MAGNESIUM SULFATE 20 GM/500ML SOLUTION: Performed by: OBSTETRICS & GYNECOLOGY

## 2025-02-20 PROCEDURE — 25010000002 METOCLOPRAMIDE PER 10 MG: Performed by: OBSTETRICS & GYNECOLOGY

## 2025-02-20 PROCEDURE — 93306 TTE W/DOPPLER COMPLETE: CPT

## 2025-02-20 PROCEDURE — 93306 TTE W/DOPPLER COMPLETE: CPT | Performed by: INTERNAL MEDICINE

## 2025-02-20 RX ORDER — BUTALBITAL, ACETAMINOPHEN AND CAFFEINE 50; 325; 40 MG/1; MG/1; MG/1
2 TABLET ORAL ONCE
Status: COMPLETED | OUTPATIENT
Start: 2025-02-20 | End: 2025-02-20

## 2025-02-20 RX ORDER — METOCLOPRAMIDE HYDROCHLORIDE 5 MG/ML
10 INJECTION INTRAMUSCULAR; INTRAVENOUS EVERY 6 HOURS PRN
Status: DISCONTINUED | OUTPATIENT
Start: 2025-02-20 | End: 2025-02-22 | Stop reason: HOSPADM

## 2025-02-20 RX ORDER — OXYCODONE AND ACETAMINOPHEN 5; 325 MG/1; MG/1
1 TABLET ORAL ONCE AS NEEDED
Status: COMPLETED | OUTPATIENT
Start: 2025-02-20 | End: 2025-02-22

## 2025-02-20 RX ORDER — CYCLOBENZAPRINE HCL 10 MG
10 TABLET ORAL ONCE AS NEEDED
Status: COMPLETED | OUTPATIENT
Start: 2025-02-20 | End: 2025-02-20

## 2025-02-20 RX ORDER — ACETAMINOPHEN 500 MG
1000 TABLET ORAL EVERY 6 HOURS PRN
Status: DISCONTINUED | OUTPATIENT
Start: 2025-02-20 | End: 2025-02-22 | Stop reason: HOSPADM

## 2025-02-20 RX ORDER — METOCLOPRAMIDE HYDROCHLORIDE 5 MG/ML
10 INJECTION INTRAMUSCULAR; INTRAVENOUS EVERY 6 HOURS
Status: DISCONTINUED | OUTPATIENT
Start: 2025-02-20 | End: 2025-02-20

## 2025-02-20 RX ORDER — OXYCODONE AND ACETAMINOPHEN 5; 325 MG/1; MG/1
1 TABLET ORAL ONCE
Status: DISCONTINUED | OUTPATIENT
Start: 2025-02-20 | End: 2025-02-20

## 2025-02-20 RX ORDER — OXYCODONE AND ACETAMINOPHEN 5; 325 MG/1; MG/1
1 TABLET ORAL ONCE
Status: COMPLETED | OUTPATIENT
Start: 2025-02-20 | End: 2025-02-20

## 2025-02-20 RX ADMIN — DEXTROSE AND SODIUM CHLORIDE 75 ML/HR: 5; 450 INJECTION, SOLUTION INTRAVENOUS at 11:07

## 2025-02-20 RX ADMIN — ACETAMINOPHEN 1000 MG: 500 TABLET, FILM COATED ORAL at 15:56

## 2025-02-20 RX ADMIN — BUTALBITAL, ACETAMINOPHEN, AND CAFFEINE 2 TABLET: 50; 325; 40 TABLET ORAL at 07:54

## 2025-02-20 RX ADMIN — CYCLOBENZAPRINE HYDROCHLORIDE 10 MG: 10 TABLET, FILM COATED ORAL at 12:56

## 2025-02-20 RX ADMIN — MAGNESIUM SULFATE IN WATER 2 G/HR: 20 INJECTION, SOLUTION INTRAVENOUS at 06:26

## 2025-02-20 RX ADMIN — METOCLOPRAMIDE 10 MG: 5 INJECTION, SOLUTION INTRAMUSCULAR; INTRAVENOUS at 08:49

## 2025-02-20 RX ADMIN — MAGNESIUM SULFATE IN WATER 2 G/HR: 20 INJECTION, SOLUTION INTRAVENOUS at 17:33

## 2025-02-20 RX ADMIN — OXYCODONE HYDROCHLORIDE AND ACETAMINOPHEN 1 TABLET: 5; 325 TABLET ORAL at 00:43

## 2025-02-20 NOTE — PROGRESS NOTES
2/20/2025  PPD #6    Subjective   Admitted last PM with severe range BPs, started on magnesium.   Ariane feels better since starting the mag. She reports she still has a headache but it did improve when starting the mag. .  Patient describes her lochia less than menses.  Pain is well controlled    She had a planned outpatient echo today ordered per cards for an elevated bnp.  She is wearing  holter monitor. She had some pp bradycardia. Pulse currently 85.        Objective   Temp: Temp:  [97.5 °F (36.4 °C)-97.9 °F (36.6 °C)] 97.8 °F (36.6 °C) Temp src: Oral   BP: BP: (109-178)/() 121/83        Pulse: Heart Rate:  [52-94] 85  RR: Resp:  [15-18] 16    General:  No acute distress   Abdomen: Fundus firm and beneath umbilicus   Pelvis: deferred     Lab Results   Component Value Date    WBC 7.64 02/19/2025    HGB 14.0 02/19/2025    HCT 39.8 02/19/2025    MCV 92.6 02/19/2025     02/19/2025    HEPBSAG Negative 07/11/2024     Results from last 7 days   Lab Units 02/13/25  1930   TREPONEMA PALLIDUM AB TOTAL  Non-Reactive     Assessment/ plan  PPD# 6 after vaginal delivery, readmitted with PP preeclampsia. On mag. BP normal on no meds. Diuresing well. Labs normal.   Will cont her cardiac eval as inpatient.   Will monitor her HA. If doesn't improve, may need MRI head..           This note has been electronically signed.    Tiffani Prince MD  February 20, 2025

## 2025-02-20 NOTE — PAYOR COMM NOTE
"Lukas Vasquez (30 y.o. Female)     From:Chyna Leonard LPN, Utilization Review  Phone #[phone]  Fax #[phone]     ID#[id number]   LUKAS VASQUEZ  ADDRESS:  [address] [place] [address]  PHONE #[phone]    FACILITY:  [facility]  NPI#[id number]  TAX ID#[id number]  [address] [id number]  PHONE #[phone]    PHYSICIAN:  DR JUANJO PRINCE  NPI#[id number]  [address] [facility] [address]  [place] [id number]  PHONE #[phone]    DIAGNOSIS  CODE:  O14.95  PREECLAMPSIA IN POSTPARTUM PERIOD                Date of Birth   1995    Social Security Number       Address   [address] [place] [address]    Home Phone   [phone]    MRN   [id number]       Quaker   Hindu    Marital Status                               Admission Date   2/19/25    Admission Type   Elective    Admitting Provider   Juanjo Prince MD    Attending Provider   Juanjo Prince MD    Department, Room/Bed   [facility] ANTEPARTUM, N329/1       Discharge Date       Discharge Disposition       Discharge Destination                                 Attending Provider: Juanjo Prince MD    Allergies: Sulfa Antibiotics    Isolation: None   Infection: None   Code Status: Prior    Ht: 170.2 cm (67\")   Wt: 74.8 kg (164 lb 14.5 oz)    Admission Cmt: None   Principal Problem: Preeclampsia in postpartum period [O14.95]                   Active Insurance as of 2/19/2025       Primary Coverage       Payor Plan Insurance Group Employer/Plan Group    [employer]AN[employer] PAT[employer] PRIME        Payor Plan Address Payor Plan Phone Number Payor Plan Fax Number Effective Dates    [address]   9/13/2024 - None Entered    ATTN: NEW CLAIMS       [facility] [address]         Subscriber Name Subscriber Birth Date Member ID       LUKAS VASQUEZ 1995 [id number]                     Emergency Contacts        (Rel.) Home Phone Work Phone " "Mobile Phone    WILL TANNER (Spouse) [phone] -- --              Insurance Information                  / PRIME Phone: --    Subscriber: Ariane Vasquez Subscriber#: [id number]    Group#: -- Precert#: --    Authorization#: -- Effective Date: --             History & Physical        HighJayy MD at 25          Ariane Vasquez  1995  [id number]  [id number]    CC: elevated BP at home (176/110)  HPI:  Patient is 30 y.o. female   s/p   presents with c/o HA (all day) no relief with Motrin and Tyl.  Pt also with BP of 176/100.  Assoc N, no V.  Pt is breast feeding and bleeding is scant.    PMH:  Current meds: PNV, Tyl, Motrin,   Illnesses: none  Surgeries: lap appy, LEEP, oral surg  Allergies: sulfa- rash    Past OB History:       OB History    Para Term  AB Living   1 1 1 0 0 1   SAB IAB Ectopic Molar Multiple Live Births   0 0 0 0 0 1      # Outcome Date GA Lbr Ashutosh/2nd Weight Sex Type Anes PTL Lv   1 Term 25 39w5d / 02:46 3990 g (8 lb 12.7 oz) F Vag-Spont EPI N PONCHO      Name:  Julio      Apgar1: 8  Apgar5: 9            SH: tob neg , EtOH neg, drugs neg      General ROS: HA, swelling, N,.   All other systems reviewed and are negative.      Physical Examination: General appearance - alert, well appearing, and in no distress  Vital signs - Ht 170.2 cm (67.01\")   Wt 74.8 kg (165 lb)   LMP 2024   BMI 25.84 kg/m²   Neuro: A and O X 3, Cr nn 2-12 intact, motor 5/5, sensory normal  HEENT: normocephalic, atraumatic,oropharynx clear, appearance of ears and nose normal  Neck - supple, no significant adenopathy, no thyromegaly  Lymphatics - no palpable lymphadenopathy in the neck or groin, no hepatosplenomegaly  Chest - clear to auscultation, no wheezes, rales or rhonchi, respiratory effort non-labored  Heart - normal rate, regular rhythm, no murmurs, rubs, clicks or gallops, no JVD, 1+ lower extremity edema  Abdomen - soft, " nontender, nondistended, no masses, no hepatosplenomegaly  no rebound tenderness noted, bowel sounds normal  Extremities - no pedal edema noted, no calf tenderness  Skin -warm and dry, normal coloration and turgor, no rashes, no suspicious skin lesions noted      Labs:  Results from last 7 days   Lab Units 02/18/25  2145   WBC 10*3/mm3 8.43   HEMOGLOBIN g/dL 13.7   HEMATOCRIT % 39.9   PLATELETS 10*3/mm3 218     Results from last 7 days   Lab Units 02/18/25  2145   SODIUM mmol/L 138   POTASSIUM mmol/L 3.7   CHLORIDE mmol/L 107   CO2 mmol/L 23.0   BUN mg/dL 13   CREATININE mg/dL 0.71   CALCIUM mg/dL 9.2   BILIRUBIN mg/dL 0.7   ALK PHOS U/L 165*   ALT (SGPT) U/L 21   AST (SGOT) U/L 24   GLUCOSE mg/dL 132*       Assessment 1)severe preeclampsia postpartum    Plan 1)admit   2)magnesium/antihypertensives   3)discussed with Dr. Forester Jayy Zapata MD  2/19/2025  21:33 EST                                                                       Electronically signed by Jayy Zapata MD at 02/19/25 2138       Vital Signs (last day)       Date/Time Temp Temp src Pulse Resp BP Patient Position SpO2    02/20/25 1033 -- -- 73 16 118/74 -- 96    02/20/25 0931 -- -- 84 16 122/82 -- --    02/20/25 0831 -- -- 85 16 121/83 -- --    02/20/25 0730 -- -- 71 16 120/77 -- 98    02/20/25 0627 -- -- 83 15 116/78 -- 97    02/20/25 0540 -- -- 76 16 113/72 -- 96    02/20/25 0435 97.8 (36.6) Oral 69 18 115/78 Lying 98    02/20/25 0331 -- -- 69 16 116/80 Lying 97    02/20/25 0230 -- -- 73 16 109/68 Lying 98    02/20/25 0130 -- -- 67 16 117/75 Lying 99    02/20/25 0030 -- -- 68 16 124/78 Lying 98    02/19/25 2334 97.9 (36.6) Oral 64 18 115/74 Lying 97    02/19/25 2240 -- -- 65 16 123/77 Lying 97    02/19/25 2204 -- -- 94 -- -- -- 96    02/19/25 2203 -- -- 81 -- -- -- 96    02/19/25 2201 -- -- 75 -- 130/83 -- 94    02/19/25 2200 -- -- 75 -- 124/84 -- 95    02/19/25 2159 -- -- 81 -- -- -- 96    02/19/25 2158 -- -- 80 -- -- -- 96    02/19/25  2157 -- -- 83 -- -- -- 94    02/19/25 2156 -- -- 78 -- -- -- 91    02/19/25 2155 -- -- 78 16 129/90 Lying 94    02/19/25 2154 -- -- 74 -- -- -- 96    02/19/25 2153 -- -- 80 -- -- -- 96    02/19/25 2152 -- -- 87 -- -- -- 96    02/19/25 2151 -- -- -- -- -- -- 94    02/19/25 2150 -- -- 82 18 138/91 Lying 96    02/19/25 2149 -- -- 71 -- -- -- 96    02/19/25 2148 -- -- 70 -- -- -- 96    02/19/25 2147 -- -- 76 -- -- -- 97    02/19/25 2146 -- -- 70 -- -- -- 94    02/19/25 2145 -- -- 74 18 140/95 Lying 95    02/19/25 2144 -- -- 69 -- -- -- 95    02/19/25 2143 -- -- 72 -- 145/91 -- 97    02/19/25 2142 -- -- 73 -- -- -- 98    02/19/25 2141 -- -- 68 -- -- -- 96    02/19/25 2140 -- -- 67 -- -- -- 97    02/19/25 2139 -- -- 68 -- -- -- 98    02/19/25 2138 -- -- 68 -- -- -- 97    02/19/25 2137 -- -- 64 -- -- -- 98    02/19/25 2136 -- -- 65 -- -- -- 97    02/19/25 2135 -- -- 62 -- -- -- 97    02/19/25 2134 -- -- 64 -- -- -- 98    02/19/25 2133 -- -- 60 -- -- -- 97    02/19/25 2132 -- -- 63 -- -- -- 96    02/19/25 2131 -- -- 63 -- -- -- 96    02/19/25 2130 -- -- 72 -- -- -- 98    02/19/25 2129 -- -- 64 -- -- -- 98    02/19/25 2128 -- -- 58 -- -- -- 97    02/19/25 2127 -- -- 61 -- -- -- 98    02/19/25 2126 -- -- 68 -- -- -- 98    02/19/25 2125 -- -- 63 -- -- -- 98    02/19/25 2124 -- -- 63 -- -- -- 98    02/19/25 2123 -- -- 59 -- -- -- 97    02/19/25 2122 -- -- 57 -- -- -- 97    02/19/25 2121 -- -- 62 -- -- -- 96    02/19/25 2120 97.5 (36.4) Oral 59 18 174/109 Lying 96    02/19/25 2119 -- -- 59 -- -- -- 97    02/19/25 2118 -- -- 65 -- -- -- 97    02/19/25 2117 -- -- 61 -- -- -- 96    02/19/25 2116 -- -- 59 -- -- -- 96    02/19/25 2115 -- -- -- -- 177/109 -- --    02/19/25 2114 -- -- 58 -- -- -- 97    02/19/25 2113 -- -- 59 -- -- -- 97    02/19/25 2112 -- -- 59 -- -- -- 97    02/19/25 2111 -- -- 56 -- -- -- 96    02/19/25 2110 -- -- 60 -- 173/111 -- 96    02/19/25 2109 -- -- 58 -- -- -- 96    02/19/25 2108 -- -- 59 -- -- -- 97     02/19/25 2107 -- -- 59 -- -- -- 97    02/19/25 2106 -- -- 56 -- -- -- 97    02/19/25 2105 -- -- 59 -- 171/110 -- 98    02/19/25 2104 -- -- 56 -- -- -- 98    02/19/25 2103 -- -- 64 -- -- -- 99    02/19/25 2102 -- -- -- -- 178/108 -- --    02/19/25 2101 -- -- 62 -- -- -- 98    02/19/25 2100 -- -- 61 -- -- -- 97    02/19/25 2059 -- -- 64 -- -- -- 98    02/19/25 2058 -- -- 60 -- -- -- 98    02/19/25 2057 -- -- 62 -- -- -- 98    02/19/25 2056 -- -- 63 -- -- -- 97    02/19/25 2055 -- -- 66 -- -- -- 97    02/19/25 2054 -- -- 56 -- -- -- 97    02/19/25 2053 -- -- 61 -- -- -- 98    02/19/25 2052 -- -- 63 -- -- -- 98    02/19/25 2051 -- -- 57 -- -- -- 97    02/19/25 2050 -- -- 59 -- 165/106 -- 97    02/19/25 2049 -- -- 67 -- -- -- 96    02/19/25 2048 -- -- 57 -- -- -- 97    02/19/25 2047 -- -- 57 -- -- -- 96    02/19/25 2046 -- -- 58 -- -- -- 97    02/19/25 2045 -- -- 60 -- 161/101 -- 97    02/19/25 2044 -- -- 56 -- -- -- 97    02/19/25 2043 -- -- 57 -- -- -- 96    02/19/25 2042 -- -- 56 -- -- -- 97    02/19/25 2041 -- -- 56 -- -- -- 97    02/19/25 2040 -- -- 59 -- -- -- 97    02/19/25 2039 -- -- 59 -- 146/102 -- 97          Facility-Administered Medications as of 2/20/2025   Medication Dose Route Frequency Provider Last Rate Last Admin    [COMPLETED] butalbital-acetaminophen-caffeine (FIORICET, ESGIC) -40 MG per tablet 2 tablet  2 tablet Oral Once High, Jayy SCHWARTZ MD   2 tablet at 02/20/25 0754    calcium gluconate injection 1 g  1 g Intravenous Once PRN HighJayy MD        dextrose 5 % and sodium chloride 0.45 % infusion  75 mL/hr Intravenous Continuous HighJayy MD 75 mL/hr at 02/19/25 2200 75 mL/hr at 02/19/25 2200    [COMPLETED] ketorolac (TORADOL) injection 30 mg  30 mg Intravenous Once HighJayy MD   30 mg at 02/19/25 2302    magnesium sulfate 20 GM/500ML infusion  2 g/hr Intravenous Continuous HighJayy MD 50 mL/hr at 02/20/25 0626 2 g/hr at 02/20/25 0626    [COMPLETED] magnesium sulfate  bolus from bag 0.04 g/mL solution 4 g  4 g Intravenous Once High, Jayy SCHWARTZ MD   4 g at 02/19/25 2142    metoclopramide (REGLAN) injection 10 mg  10 mg Intravenous Q6H PRN Tiffani Prince MD   10 mg at 02/20/25 0849    [COMPLETED] NIFEdipine (PROCARDIA) capsule 10 mg  10 mg Oral Once High, Jayy SCHWARTZ MD   10 mg at 02/19/25 2117    ondansetron (ZOFRAN) injection 4 mg  4 mg Intravenous Q6H PRN High, Jayy SCHWARTZ MD   4 mg at 02/19/25 2138    [COMPLETED] oxyCODONE-acetaminophen (PERCOCET) 5-325 MG per tablet 1 tablet  1 tablet Oral Once High, Jayy SCHWARTZ MD   1 tablet at 02/20/25 0043    oxyCODONE-acetaminophen (PERCOCET) 5-325 MG per tablet 1 tablet  1 tablet Oral Once Tiffani Prince MD        sodium chloride 0.9 % flush 10 mL  10 mL Intravenous Q12H High, Jayy SCHWARTZ MD   10 mL at 02/19/25 2202    sodium chloride 0.9 % flush 10 mL  10 mL Intravenous PRN High, Jayy SCHWARTZ MD         Lab Results (last 24 hours)       Procedure Component Value Units Date/Time    Comprehensive Metabolic Panel [[id number]]  (Abnormal) Collected: 02/19/25 2145    Specimen: Blood Updated: 02/19/25 2218     Glucose 84 mg/dL      BUN 11 mg/dL      Creatinine 0.64 mg/dL      Sodium 141 mmol/L      Potassium 3.6 mmol/L      Chloride 107 mmol/L      CO2 18.0 mmol/L      Calcium 8.9 mg/dL      Total Protein 5.9 g/dL      Albumin 3.6 g/dL      ALT (SGPT) 20 U/L      AST (SGOT) 20 U/L      Alkaline Phosphatase 155 U/L      Total Bilirubin 0.8 mg/dL      Globulin 2.3 gm/dL      Comment: Calculated Result        A/G Ratio 1.6 g/dL      BUN/Creatinine Ratio 17.2     Anion Gap 16.0 mmol/L      eGFR 120.6 mL/min/1.73     Narrative:      GFR Categories in Chronic Kidney Disease (CKD)      GFR Category          GFR (mL/min/1.73)    Interpretation  G1                     90 or greater         Normal or high (1)  G2                      60-89                Mild decrease (1)  G3a                   45-59                Mild to moderate decrease  G3b                    30-44                Moderate to severe decrease  G4                    15-29                Severe decrease  G5                    14 or less           Kidney failure          (1)In the absence of evidence of kidney disease, neither GFR category G1 or G2 fulfill the criteria for CKD.    eGFR calculation 2021 CKD-EPI creatinine equation, which does not include race as a factor    Urinalysis With Microscopic If Indicated (No Culture) - Urine, Clean Catch [[id number]]  (Abnormal) Collected: 02/19/25 2145    Specimen: Urine, Clean Catch Updated: 02/19/25 2204     Color, UA Yellow     Appearance, UA Clear     pH, UA 5.5     Specific Gravity, UA 1.021     Glucose, UA Negative     Ketones, UA >=160 mg/dL (4+)     Bilirubin, UA Negative     Blood, UA Large (3+)     Protein, UA Trace     Leuk Esterase, UA Moderate (2+)     Nitrite, UA Negative     Urobilinogen, UA 0.2 E.U./dL    Urinalysis, Microscopic Only - Urine, Clean Catch [[id number]]  (Abnormal) Collected: 02/19/25 2145    Specimen: Urine, Clean Catch Updated: 02/19/25 2204     RBC, UA 21-50 /HPF      WBC, UA 21-50 /HPF      Bacteria, UA None Seen /HPF      Squamous Epithelial Cells, UA 3-6 /HPF      Hyaline Casts, UA 13-20 /LPF      Methodology Automated Microscopy    CBC (No Diff) [[id number]]  (Abnormal) Collected: 02/19/25 2145    Specimen: Blood Updated: 02/19/25 2200     WBC 7.64 10*3/mm3      RBC 4.30 10*6/mm3      Hemoglobin 14.0 g/dL      Hematocrit 39.8 %      MCV 92.6 fL      MCH 32.6 pg      MCHC 35.2 g/dL      RDW 12.0 %      RDW-SD 40.8 fl      MPV 10.3 fL      Platelets 251 10*3/mm3     POC Protein, Urine, Qualitative, Dipstick [[id number]]  (Abnormal) Collected: 02/19/25 2050    Specimen: Urine Updated: 02/19/25 2051     Protein, POC 1+ mg/dL      Lot Number [id number]     Expiration Date 08/31/2025          Orders (last 24 hrs)        Start     Ordered    02/20/25 1100  oxyCODONE-acetaminophen (PERCOCET) 5-325 MG per tablet 1 tablet  Once         02/20/25  1014    02/20/25 0915  metoclopramide (REGLAN) injection 10 mg  Every 6 Hours,   Status:  Discontinued         02/20/25 0827    02/20/25 0846  Admit To Obstetrics Inpatient  Once         02/20/25 0846    02/20/25 0845  butalbital-acetaminophen-caffeine (FIORICET, ESGIC) -40 MG per tablet 2 tablet  Once         02/20/25 0749    02/20/25 0841  metoclopramide (REGLAN) injection 10 mg  Every 6 Hours PRN         02/20/25 0842    02/20/25 0819  Adult Transthoracic Echo Complete W/ Cont if Necessary Per Protocol  Once         02/20/25 0818    02/20/25 0633  Diet: Regular/House; Fluid Consistency: Thin (IDDSI 0)  Diet Effective Now         02/20/25 0633    02/20/25 0130  oxyCODONE-acetaminophen (PERCOCET) 5-325 MG per tablet 1 tablet  Once         02/20/25 0035    02/19/25 2345  ketorolac (TORADOL) injection 30 mg  Once         02/19/25 2251    02/19/25 2300  dextrose 5 % and sodium chloride 0.45 % infusion  Continuous         02/19/25 2211 02/19/25 2203  Urinalysis, Microscopic Only - Urine, Clean Catch  Once         02/19/25 2202 02/19/25 2200  magnesium sulfate bolus from bag 0.04 g/mL solution 4 g  Once         02/19/25 2105 02/19/25 2200  magnesium sulfate 20 GM/500ML infusion  Continuous         02/19/25 2105 02/19/25 2200  sodium chloride 0.9 % flush 10 mL  Every 12 Hours Scheduled         02/19/25 2105 02/19/25 2200  NIFEdipine (PROCARDIA) capsule 10 mg  Once         02/19/25 2106 02/19/25 2200  lactated ringers infusion  Continuous,   Status:  Discontinued         02/19/25 2108 02/19/25 2115  ondansetron (ZOFRAN) injection 4 mg  Every 6 Hours PRN         02/19/25 2116 02/19/25 2104  Insert Peripheral IV  Once         02/19/25 2105 02/19/25 2104  PERIPHERAL IV CARE - Connectors / Hubs Must Be Scrubbed 15 Seconds Using 70% Alcohol & Allowed to Dry Before Accessing Line  Continuous         02/19/25 2105 02/19/25 2103  sodium chloride 0.9 % flush 10 mL  As Needed         02/19/25  2105 02/19/25 2103  calcium gluconate injection 1 g  Once As Needed         02/19/25 2105 02/19/25 2047  Urinalysis With Microscopic If Indicated (No Culture) - Urine, Clean Catch  Once         02/19/25 2046 02/19/25 2047  POC Protein, Urine, Qualitative, Dipstick  Once         02/19/25 2046 02/19/25 2046  CBC (No Diff)  STAT         02/19/25 2046 02/19/25 2046  Comprehensive Metabolic Panel  STAT         02/19/25 2046    Unscheduled  Change Peripheral IV Site  As Needed      Comments: Frequency Per Facility Policy    02/19/25 2105    Unscheduled  PERIPHERAL IV CARE - Change Dressing As Needed When Damp, Loose or Soiled  As Needed       02/19/25 2105    Signed and Held  Vital Signs  Every 15 Minutes         Signed and Held    Signed and Held  Continuous Pulse Oximetry  Continuous         Signed and Held    Signed and Held  Check Clonus (DTRs) With Vitals  Per Hospital Policy/Protocol         Signed and Held    Signed and Held  Assess Lung Sounds  Per Hospital Policy/Protocol         Signed and Held    Signed and Held  Assess LOC With Vitals  Per Hospital Policy/Protocol         Signed and Held    Signed and Held  Strict I and O  Every Shift       Signed and Held    Signed and Held  Total IV Fluids Should Equal 125 mL/hour  Continuous         Signed and Held    Signed and Held  Notify Provider of Suspected Magnesium Toxicity  Until Discontinued         Signed and Held    Signed and Held  Insert Peripheral IV  Once         Signed and Held    Signed and Held  Saline Lock & Maintain IV Access  Continuous         Signed and Held    Signed and Held  sodium chloride 0.9 % flush 10 mL  Every 12 Hours Scheduled         Signed and Held    Signed and Held  sodium chloride 0.9 % flush 10 mL  As Needed         Signed and Held    Signed and Held  sodium chloride 0.9 % infusion 40 mL  As Needed         Signed and Held    Signed and Held  dextrose 5 % and sodium chloride 0.45 % infusion  Continuous         Signed and  "Held    Signed and Held  ondansetron (ZOFRAN) injection 4 mg  Every 6 Hours PRN         Signed and Held    Signed and Held  hydrALAZINE (APRESOLINE) injection 5-10 mg  Every 20 Minutes PRN        Placed in \"Or\" Linked Group    Signed and Held    Signed and Held  labetalol (NORMODYNE,TRANDATE) injection 20-80 mg  Every 10 Minutes PRN        Placed in \"Or\" Linked Group    Signed and Held    Signed and Held  NIFEdipine (PROCARDIA) capsule 10-20 mg  Every 20 Minutes PRN        Placed in \"Or\" Linked Group    Signed and Held    Signed and Held  NIFEdipine XL (PROCARDIA XL) 24 hr tablet 30 mg  Every 24 Hours Scheduled         Signed and Held    Signed and Held  Diet: Regular/House; Fluid Consistency: Thin (IDDSI 0)  Diet Effective Now         Signed and Held    Signed and Held  Activity As Tolerated  Until Discontinued         Signed and Held    Signed and Held  ibuprofen (ADVIL,MOTRIN) tablet 600 mg  Every 6 Hours PRN         Signed and Held    Signed and Held  acetaminophen (TYLENOL) tablet 1,000 mg  Every 6 Hours PRN         Signed and Held                     Physician Progress Notes (last 24 hours)        Tiffani Prince MD at 02/20/25 0846          2/20/2025  PPD #6    Subjective   Admitted last PM with severe range BPs, started on magnesium.   Ariane feels better since starting the mag. She reports she still has a headache but it did improve when starting the mag. .  Patient describes her lochia less than menses.  Pain is well controlled    She had a planned outpatient echo today ordered per cards for an elevated bnp.  She is wearing  holter monitor. She had some pp bradycardia. Pulse currently 85.        Objective   Temp: Temp:  [97.5 °F (36.4 °C)-97.9 °F (36.6 °C)] 97.8 °F (36.6 °C) Temp src: Oral   BP: BP: (109-178)/() 121/83        Pulse: Heart Rate:  [52-94] 85  RR: Resp:  [15-18] 16    General:  No acute distress   Abdomen: Fundus firm and beneath umbilicus   Pelvis: deferred     Lab Results   Component " Value Date    WBC 7.64 02/19/2025    HGB 14.0 02/19/2025    HCT 39.8 02/19/2025    MCV 92.6 02/19/2025     02/19/2025    HEPBSAG Negative 07/11/2024     Results from last 7 days   Lab Units 02/13/25  1930   TREPONEMA PALLIDUM AB TOTAL  Non-Reactive     Assessment/ plan  PPD# 6 after vaginal delivery, readmitted with PP preeclampsia. On mag. BP normal on no meds. Diuresing well. Labs normal.   Will cont her cardiac eval as inpatient.   Will monitor her HA. If doesn't improve, may need MRI head..           This note has been electronically signed.    Tiffani Prince MD  February 20, 2025    Electronically signed by Tiffani Prince MD at 02/20/25 9475

## 2025-02-20 NOTE — H&P
"Ariane Vasquez  1995  [id number]  [id number]    CC: elevated BP at home (176/110)  HPI:  Patient is 30 y.o. female   s/p   presents with c/o HA (all day) no relief with Motrin and Tyl.  Pt also with BP of 176/100.  Assoc N, no V.  Pt is breast feeding and bleeding is scant.    PMH:  Current meds: PNV, Tyl, Motrin,   Illnesses: none  Surgeries: lap appy, LEEP, oral surg  Allergies: sulfa- rash    Past OB History:       OB History    Para Term  AB Living   1 1 1 0 0 1   SAB IAB Ectopic Molar Multiple Live Births   0 0 0 0 0 1      # Outcome Date GA Lbr Ashutosh/2nd Weight Sex Type Anes PTL Lv   1 Term 25 39w5d / 02:46 3990 g (8 lb 12.7 oz) F Vag-Spont EPI N PONCHO      Name:  Julio      Apgar1: 8  Apgar5: 9            SH: tob neg , EtOH neg, drugs neg      General ROS: HA, swelling, N,.   All other systems reviewed and are negative.      Physical Examination: General appearance - alert, well appearing, and in no distress  Vital signs - Ht 170.2 cm (67.01\")   Wt 74.8 kg (165 lb)   LMP 2024   BMI 25.84 kg/m²   Neuro: A and O X 3, Cr nn 2-12 intact, motor 5/5, sensory normal  HEENT: normocephalic, atraumatic,oropharynx clear, appearance of ears and nose normal  Neck - supple, no significant adenopathy, no thyromegaly  Lymphatics - no palpable lymphadenopathy in the neck or groin, no hepatosplenomegaly  Chest - clear to auscultation, no wheezes, rales or rhonchi, respiratory effort non-labored  Heart - normal rate, regular rhythm, no murmurs, rubs, clicks or gallops, no JVD, 1+ lower extremity edema  Abdomen - soft, nontender, nondistended, no masses, no hepatosplenomegaly  no rebound tenderness noted, bowel sounds normal  Extremities - no pedal edema noted, no calf tenderness  Skin -warm and dry, normal coloration and turgor, no rashes, no suspicious skin lesions noted      Labs:  Results from last 7 days   Lab Units 25  2145   WBC 10*3/mm3 8.43   HEMOGLOBIN g/dL " 13.7   HEMATOCRIT % 39.9   PLATELETS 10*3/mm3 218     Results from last 7 days   Lab Units 02/18/25  2145   SODIUM mmol/L 138   POTASSIUM mmol/L 3.7   CHLORIDE mmol/L 107   CO2 mmol/L 23.0   BUN mg/dL 13   CREATININE mg/dL 0.71   CALCIUM mg/dL 9.2   BILIRUBIN mg/dL 0.7   ALK PHOS U/L 165*   ALT (SGPT) U/L 21   AST (SGOT) U/L 24   GLUCOSE mg/dL 132*       Assessment 1)severe preeclampsia postpartum    Plan 1)admit   2)magnesium/antihypertensives   3)discussed with Dr. Forester Jayy Zapata MD  2/19/2025  21:33 EST

## 2025-02-21 ENCOUNTER — APPOINTMENT (OUTPATIENT)
Dept: MRI IMAGING | Facility: HOSPITAL | Age: 30
End: 2025-02-21
Payer: OTHER GOVERNMENT

## 2025-02-21 LAB
QT INTERVAL: 442 MS
QTC INTERVAL: 399 MS

## 2025-02-21 PROCEDURE — 25010000002 KETOROLAC TROMETHAMINE PER 15 MG: Performed by: OBSTETRICS & GYNECOLOGY

## 2025-02-21 PROCEDURE — 70544 MR ANGIOGRAPHY HEAD W/O DYE: CPT

## 2025-02-21 RX ORDER — NIFEDIPINE 30 MG/1
30 TABLET, EXTENDED RELEASE ORAL
Status: DISCONTINUED | OUTPATIENT
Start: 2025-02-21 | End: 2025-02-22

## 2025-02-21 RX ORDER — KETOROLAC TROMETHAMINE 30 MG/ML
30 INJECTION, SOLUTION INTRAMUSCULAR; INTRAVENOUS ONCE
Status: COMPLETED | OUTPATIENT
Start: 2025-02-21 | End: 2025-02-21

## 2025-02-21 RX ORDER — LABETALOL 200 MG/1
200 TABLET, FILM COATED ORAL 2 TIMES DAILY
Status: DISCONTINUED | OUTPATIENT
Start: 2025-02-21 | End: 2025-02-21

## 2025-02-21 RX ORDER — LABETALOL 200 MG/1
200 TABLET, FILM COATED ORAL 2 TIMES DAILY
Qty: 4 TABLET | Refills: 0 | Status: SHIPPED | OUTPATIENT
Start: 2025-02-21 | End: 2025-02-22 | Stop reason: HOSPADM

## 2025-02-21 RX ORDER — LABETALOL 200 MG/1
200 TABLET, FILM COATED ORAL EVERY 8 HOURS SCHEDULED
Status: DISCONTINUED | OUTPATIENT
Start: 2025-02-21 | End: 2025-02-22 | Stop reason: HOSPADM

## 2025-02-21 RX ORDER — SUMATRIPTAN 50 MG/1
50 TABLET, FILM COATED ORAL
Status: DISCONTINUED | OUTPATIENT
Start: 2025-02-21 | End: 2025-02-22 | Stop reason: HOSPADM

## 2025-02-21 RX ORDER — NIFEDIPINE 10 MG/1
10 CAPSULE ORAL ONCE
Status: COMPLETED | OUTPATIENT
Start: 2025-02-21 | End: 2025-02-21

## 2025-02-21 RX ORDER — LABETALOL 200 MG/1
200 TABLET, FILM COATED ORAL ONCE
Status: COMPLETED | OUTPATIENT
Start: 2025-02-21 | End: 2025-02-21

## 2025-02-21 RX ORDER — HYDROCODONE BITARTRATE AND ACETAMINOPHEN 5; 325 MG/1; MG/1
1 TABLET ORAL EVERY 6 HOURS PRN
Status: DISCONTINUED | OUTPATIENT
Start: 2025-02-21 | End: 2025-02-22 | Stop reason: HOSPADM

## 2025-02-21 RX ADMIN — KETOROLAC TROMETHAMINE 30 MG: 30 INJECTION, SOLUTION INTRAMUSCULAR; INTRAVENOUS at 00:23

## 2025-02-21 RX ADMIN — SUMATRIPTAN SUCCINATE 50 MG: 50 TABLET ORAL at 15:03

## 2025-02-21 RX ADMIN — HYDROCODONE BITARTRATE AND ACETAMINOPHEN 1 TABLET: 5; 325 TABLET ORAL at 23:44

## 2025-02-21 RX ADMIN — LABETALOL HYDROCHLORIDE 200 MG: 200 TABLET, FILM COATED ORAL at 22:20

## 2025-02-21 RX ADMIN — NIFEDIPINE 30 MG: 30 TABLET, FILM COATED, EXTENDED RELEASE ORAL at 18:06

## 2025-02-21 RX ADMIN — LABETALOL HYDROCHLORIDE 200 MG: 200 TABLET, FILM COATED ORAL at 00:23

## 2025-02-21 RX ADMIN — ACETAMINOPHEN 1000 MG: 500 TABLET, FILM COATED ORAL at 11:40

## 2025-02-21 RX ADMIN — ACETAMINOPHEN 1000 MG: 500 TABLET, FILM COATED ORAL at 22:21

## 2025-02-21 RX ADMIN — LABETALOL HYDROCHLORIDE 200 MG: 200 TABLET, FILM COATED ORAL at 16:17

## 2025-02-21 RX ADMIN — LABETALOL HYDROCHLORIDE 200 MG: 200 TABLET, FILM COATED ORAL at 11:38

## 2025-02-21 RX ADMIN — NIFEDIPINE 10 MG: 10 CAPSULE ORAL at 17:44

## 2025-02-21 NOTE — DISCHARGE SUMMARY
Discharge Summary    Date of Admission: 2025  Date of Discharge:  2025      Patient: Ariane Vasquez      MR#:[id number]    Delivery Provider: Hannah Noguera    Discharge Surgeon/OB:ria    Presenting Problem/History of Present Illness  Preeclampsia in postpartum period [O14.95]       Preeclampsia in postpartum period         Discharge Diagnosis: PP preeclampsia    Procedures:  PP magnesium  MRI    Discharge Date: 2025;     Hospital Course  Patient is a 30 y.o. female  at 39w5d status post vaginal delivery without complication. She was readmitted PPD 6 with severe range BPs and headache. She received 24h PP magneisum, her BP was controlled on PO labetelol. She did cont to have a mild headache, MRA/V was performed and was negative for venous sinus thrombosis. She was ready for DC in stable condition. FU 1 week      Infant:   female fetus 3990 g (8 lb 12.7 oz) with Apgar scores of 8 , 9  at five minutes.    Condition on Discharge:  Stable    Vital Signs  Temp:  [97.8 °F (36.6 °C)-98.5 °F (36.9 °C)] 98.1 °F (36.7 °C)  Heart Rate:  [55-86] 55  Resp:  [16-18] 18  BP: (115-144)/(75-99) 144/95    Lab Results   Component Value Date    WBC 7.64 2025    HGB 14.0 2025    HCT 39.8 2025    MCV 92.6 2025     2025       Discharge Disposition  Home or Self Care    Discharge Medications     Discharge Medications        New Medications        Instructions Start Date   labetalol 200 MG tablet  Commonly known as: NORMODYNE   200 mg, Oral, 2 Times Daily             Continue These Medications        Instructions Start Date   ibuprofen 600 MG tablet  Commonly known as: ADVIL,MOTRIN   600 mg, Oral, Every 6 Hours PRN      PRENATAL 1 + IRON PO   Take  by mouth.      Stool Softener 100 MG capsule  Generic drug: docusate sodium   100 mg, Oral, 2 Times Daily             Stop These Medications      polyethylene glycol 17 GM/SCOOP powder  Commonly known as: MIRALAX               Discharge Diet:     Activity at Discharge:     Follow-up Appointments  Future Appointments   Date Time Provider Department Center   3/19/2025  1:30 PM Kayley Maldonado APRN [facility]         Tiffani Prince MD  02/21/25  13:42 EST  [facility]

## 2025-02-21 NOTE — PROGRESS NOTES
2/21/2025  PPD #7    Subjective   Ariane reports persistant and bothersome headache, specifically on left side.  Patient describes her lochia less than menses.         Objective   Temp: Temp:  [97.8 °F (36.6 °C)-98.5 °F (36.9 °C)] 98.5 °F (36.9 °C) Temp src: Oral   BP: BP: (115-142)/(74-99) 128/83        Pulse: Heart Rate:  [60-95] 60  RR: Resp:  [16-18] 18    General:  No acute distress   Abdomen: Fundus firm and beneath umbilicus   Pelvis: deferred     Lab Results   Component Value Date    WBC 7.64 02/19/2025    HGB 14.0 02/19/2025    HCT 39.8 02/19/2025    MCV 92.6 02/19/2025     02/19/2025    HEPBSAG Negative 07/11/2024         Assessment/ plan  PPD# 7 after vaginal delivery, readmitted for PP preeclampsia. She is now s/p 24h PP magnesium, her BPs are well controlled on labetelol, labs normal, but her headache has not resolved. Will image MRA/V. If normal, can consider home later today.           This note has been electronically signed.    Tiffani Prince MD  February 21, 2025

## 2025-02-22 VITALS
SYSTOLIC BLOOD PRESSURE: 137 MMHG | WEIGHT: 164.9 LBS | TEMPERATURE: 97.9 F | BODY MASS INDEX: 25.88 KG/M2 | HEIGHT: 67 IN | HEART RATE: 63 BPM | RESPIRATION RATE: 16 BRPM | OXYGEN SATURATION: 99 % | DIASTOLIC BLOOD PRESSURE: 91 MMHG

## 2025-02-22 RX ORDER — NIFEDIPINE 30 MG/1
30 TABLET, EXTENDED RELEASE ORAL 2 TIMES DAILY
Status: DISCONTINUED | OUTPATIENT
Start: 2025-02-22 | End: 2025-02-22 | Stop reason: HOSPADM

## 2025-02-22 RX ORDER — NIFEDIPINE 30 MG
30 TABLET, EXTENDED RELEASE ORAL 2 TIMES DAILY
Qty: 60 TABLET | Refills: 0 | Status: SHIPPED | OUTPATIENT
Start: 2025-02-22

## 2025-02-22 RX ORDER — LABETALOL 200 MG/1
200 TABLET, FILM COATED ORAL EVERY 8 HOURS SCHEDULED
Qty: 90 TABLET | Refills: 0 | Status: SHIPPED | OUTPATIENT
Start: 2025-02-22 | End: 2025-03-03

## 2025-02-22 RX ORDER — NIFEDIPINE 10 MG/1
10 CAPSULE ORAL ONCE
Status: COMPLETED | OUTPATIENT
Start: 2025-02-22 | End: 2025-02-22

## 2025-02-22 RX ADMIN — NIFEDIPINE 10 MG: 10 CAPSULE ORAL at 04:26

## 2025-02-22 RX ADMIN — SUMATRIPTAN SUCCINATE 50 MG: 50 TABLET ORAL at 03:05

## 2025-02-22 RX ADMIN — LABETALOL HYDROCHLORIDE 200 MG: 200 TABLET, FILM COATED ORAL at 06:43

## 2025-02-22 RX ADMIN — OXYCODONE HYDROCHLORIDE AND ACETAMINOPHEN 1 TABLET: 5; 325 TABLET ORAL at 04:31

## 2025-02-22 RX ADMIN — ACETAMINOPHEN 1000 MG: 500 TABLET, FILM COATED ORAL at 08:59

## 2025-02-22 RX ADMIN — NIFEDIPINE 30 MG: 30 TABLET, FILM COATED, EXTENDED RELEASE ORAL at 08:59

## 2025-02-22 NOTE — PROGRESS NOTES
KODY Monge  Ariane Vasquez  : 1995  MRN: [id number]  CSN: [id number]    Hospital Day: 4    Postpartum Day #8  Subjective     CC: hospital follow-up    Still has a headache but it is improved.  She does think she was ready to go home     Objective     Patient Vitals for the past 24 hrs:   BP   25 1017 137/91   25 0757 147/96   25 0644 138/93   25 0643 138/93   25 0517 126/83   25 0409 164/84   25 0302 143/75   25 2312 150/83   25 1952 112/69   25 1824 130/83   25 1814 133/85   25 1804 158/97   25 1754 169/100   25 1732 (!) 177/103   25 1554 163/87   25 1548 --   25 1320 103/62   25 1210 144/95        Min/max vitals past 24 hours:  Temp  Min: 97.9 °F (36.6 °C)  Max: 98.1 °F (36.7 °C)  BP  Min: 103/62  Max: 177/103  Pulse  Min: 50  Max: 75  Resp  Min: 16  Max: 18        General: well developed; well nourished  no acute distress   Abdomen: fundus firm and non-tender   Pelvic: Not performed   Ext: Calves NT     Results from last 7 days   Lab Units 25  2145 25   WBC 10*3/mm3 7.64 8.43   HEMOGLOBIN g/dL 14.0 13.7   HEMATOCRIT % 39.8 39.9   PLATELETS 10*3/mm3 251 218     Lab Results   Component Value Date    RH Positive 2025    HEPBSAG Negative 2024              Assessment   Postpartum headache with normal imaging and lab work not consistent with HELLP syndrome  Postpartum hypertension better controlled on labetalol 200 3 times daily and Procardia XL 30 mg BID  Postpartum Day #8 S/P vaginal delivery     Plan   Okay for discharge to home  Would discourage checking of home blood pressures  Follow-up appointment early this week with Dr. Prince to recheck blood pressure      Jerome Moncada MD  2025  11:05 EST

## 2025-02-22 NOTE — DISCHARGE SUMMARY
Saleem   Ariane Vasquez  : 1995  MRN: [id number]  CSN: [id number]    Discharge Summary    A formal discharge summary was not needed because this admission was for an observation visit.    Discharge Date: 25   Discharge Dx:    Postpartum headache  Postpartum hypertension improved   Disposition: Home   Condition at discharge: Improved   Follow up: Early this week for blood pressure recheck         This note has been electronically signed.    Jerome Moncada MD

## 2025-02-24 ENCOUNTER — POSTPARTUM VISIT (OUTPATIENT)
Dept: OBSTETRICS AND GYNECOLOGY | Facility: CLINIC | Age: 30
End: 2025-02-24
Payer: OTHER GOVERNMENT

## 2025-02-24 VITALS
BODY MASS INDEX: 24.77 KG/M2 | WEIGHT: 157.8 LBS | DIASTOLIC BLOOD PRESSURE: 80 MMHG | SYSTOLIC BLOOD PRESSURE: 124 MMHG | HEIGHT: 67 IN

## 2025-02-24 DIAGNOSIS — Z01.30 BLOOD PRESSURE CHECK: ICD-10-CM

## 2025-02-24 PROBLEM — Z34.90 PREGNANCY: Status: RESOLVED | Noted: 2024-07-11 | Resolved: 2025-02-24

## 2025-02-24 PROBLEM — Z34.90 CURRENTLY PREGNANT: Status: RESOLVED | Noted: 2025-02-14 | Resolved: 2025-02-24

## 2025-02-24 PROBLEM — Z37.9 NORMAL LABOR: Status: RESOLVED | Noted: 2025-02-14 | Resolved: 2025-02-24

## 2025-02-24 PROBLEM — Z3A.39 39 WEEKS GESTATION OF PREGNANCY: Status: RESOLVED | Noted: 2025-02-14 | Resolved: 2025-02-24

## 2025-02-24 NOTE — PROGRESS NOTES
"      Chief Complaint   Patient presents with    Postpartum Care       Postpartum blood pressure check visit         Ariane Vasquez is a 30 y.o.  who presents today for a blood pressure check.    Vaginal, Spontaneous   Information for the patient's :  Niranjan Kim [[id number]]   2025   female   Niranjan Kim   3990 g (8 lb 12.7 oz)   Gestational Age: 39w5d     Baby Discharged: Discharged with Mom  Delivering Physician: Hannah Noguera MD    Patient went to labor and delivery on 2025 for HA with no relief even with Tylenol or Motrin and BP being 176/100. Patient reports she was have nausea without vomiting.The patient was diagnosed with severe preeclampsia postpartum on 2025. She reports that Dr. See told her she did not have to check her BP unless she feels bad.  The patient complains of mild constant headaches.  Patient reports Aleve has helps some.  The patient denies Right upper quadrant/ epigastric pain, Vision changes, and Swelling.     Patient describes her vaginal bleeding as scant.  Patient is breast feeding.   She denies bowel or bladder issues.    Patient denies postpartum depression. Postpartum Depression Screening Questionnaire: 3, no treatment indicated.      The additional following portions of the patient's history were reviewed and updated as appropriate: allergies and current medications.      Review of Systems   Genitourinary:  Positive for vaginal bleeding.   Neurological:  Positive for headache.   All other systems reviewed and are negative.    All other systems reviewed and are negative.     I have reviewed and agree with the HPI, ROS, and historical information as entered above. Eugenia Solis, CATHERINE      Objective   /80 (BP Location: Right arm, Patient Position: Sitting, Cuff Size: Adult)   Ht 170.2 cm (67.01\")   Wt 71.6 kg (157 lb 12.8 oz)   LMP 2024   Breastfeeding Yes   BMI 24.71 kg/m²     Physical Exam  Vitals and nursing note " reviewed.   Constitutional:       General: She is not in acute distress.     Appearance: Normal appearance. She is not ill-appearing, toxic-appearing or diaphoretic.   Pulmonary:      Effort: Pulmonary effort is normal. No respiratory distress.   Abdominal:      Palpations: Abdomen is soft.   Skin:     General: Skin is warm and dry.   Neurological:      Mental Status: She is alert and oriented to person, place, and time.   Psychiatric:         Mood and Affect: Mood normal.         Behavior: Behavior normal.         Thought Content: Thought content normal.         Judgment: Judgment normal.              Assessment and Plan    Problem List Items Addressed This Visit          Gravid and     Preeclampsia in postpartum period    Relevant Medications    labetalol (NORMODYNE) 200 MG tablet    NIFEdipine CC (ADALAT CC) 30 MG 24 hr tablet     Other Visit Diagnoses       Postpartum follow-up    -  Primary    Blood pressure check                S/p Vaginal delivery   Continued pelvic rest.   PP pre-eclampsia signs reviewed  Headaches are improving but still present. Aleve and heat have been helping the most.   Not taking bp at home per Dr. Coral JASMINE orders. Has felt better. Taking labetalol tid and procardia xl bid.   Instructed patient to take her bp only if symptomatic including hypotension (lightheadedness, dizziness, weakness). Hold medication if bp is low/symptomatic and call office.  Follow up in one week.     Eugenia Solis, APRN  2025

## 2025-02-24 NOTE — PAYOR COMM NOTE
"Christina Lukasanuradha Gruber (30 y.o. Female)     From:Chyna Leonard LPN, Utilization Review  Phone #[phone]  Fax #[phone]     ID#[id number]     Date of Admission 2/19/25  Clinical faxed 2/20/25  Discharged 2/22/25    Has this been approved?            Date of Birth   1995    Social Security Number       Address   [address] [place] [address]    Home Phone   [phone]    MRN   [id number]       Mu-ism   Orthodoxy    Marital Status                               Admission Date   2/19/25    Admission Type   Elective    Admitting Provider   Tiffani Pricne MD    Attending Provider       Department, Room/Bed   [facility] ANTEPARTUM, N329/1       Discharge Date   2/22/2025    Discharge Disposition   Home or Self Care    Discharge Destination                                 Attending Provider: (none)   Allergies: Sulfa Antibiotics    Isolation: None   Infection: None   Code Status: Prior    Ht: 170.2 cm (67\")   Wt: 74.8 kg (164 lb 14.5 oz)    Admission Cmt: None   Principal Problem: Preeclampsia in postpartum period [O14.95]                   Active Insurance as of 2/19/2025       Primary Coverage       Payor Plan Insurance Group Employer/Plan Group      PRIME        Payor Plan Address Payor Plan Phone Number Payor Plan Fax Number Effective Dates    [address] 9/13/2024 - None Entered    ATTN: NEW CLAIMS       [facility] [id number]         Subscriber Name Subscriber Birth Date Member ID       LUKAS VASQUEZ 1995 [id number]                     Emergency Contacts        (Rel.) Home Phone Work Phone Mobile Phone    WILL TANNER (Spouse) [phone] -- --              Insurance Information                  / PRIME Phone: --    Subscriber: Lukas Vasquez Subscriber#: [id number]    Group#: -- Precert#: --    Authorization#: -- Effective Date: --             History & Physical        High, " "Jayy SCHWARTZ MD at 25 2133          Ariane Vasquez  1995  [id number]  [id number]    CC: elevated BP at home (176/110)  HPI:  Patient is 30 y.o. female   s/p   presents with c/o HA (all day) no relief with Motrin and Tyl.  Pt also with BP of 176/100.  Assoc N, no V.  Pt is breast feeding and bleeding is scant.    PMH:  Current meds: PNV, Tyl, Motrin,   Illnesses: none  Surgeries: lap appy, LEEP, oral surg  Allergies: sulfa- rash    Past OB History:       OB History    Para Term  AB Living   1 1 1 0 0 1   SAB IAB Ectopic Molar Multiple Live Births   0 0 0 0 0 1      # Outcome Date GA Lbr Ashutosh/2nd Weight Sex Type Anes PTL Lv   1 Term 25 39w5d / 02:46 3990 g (8 lb 12.7 oz) F Vag-Spont EPI N PONCHO      Name:  Julio      Apgar1: 8  Apgar5: 9            SH: tob neg , EtOH neg, drugs neg      General ROS: HA, swelling, N,.   All other systems reviewed and are negative.      Physical Examination: General appearance - alert, well appearing, and in no distress  Vital signs - Ht 170.2 cm (67.01\")   Wt 74.8 kg (165 lb)   LMP 2024   BMI 25.84 kg/m²   Neuro: A and O X 3, Cr nn 2-12 intact, motor 5/5, sensory normal  HEENT: normocephalic, atraumatic,oropharynx clear, appearance of ears and nose normal  Neck - supple, no significant adenopathy, no thyromegaly  Lymphatics - no palpable lymphadenopathy in the neck or groin, no hepatosplenomegaly  Chest - clear to auscultation, no wheezes, rales or rhonchi, respiratory effort non-labored  Heart - normal rate, regular rhythm, no murmurs, rubs, clicks or gallops, no JVD, 1+ lower extremity edema  Abdomen - soft, nontender, nondistended, no masses, no hepatosplenomegaly  no rebound tenderness noted, bowel sounds normal  Extremities - no pedal edema noted, no calf tenderness  Skin -warm and dry, normal coloration and turgor, no rashes, no suspicious skin lesions noted      Labs:  Results from last 7 days   Lab Units " 02/18/25  2145   WBC 10*3/mm3 8.43   HEMOGLOBIN g/dL 13.7   HEMATOCRIT % 39.9   PLATELETS 10*3/mm3 218     Results from last 7 days   Lab Units 02/18/25  2145   SODIUM mmol/L 138   POTASSIUM mmol/L 3.7   CHLORIDE mmol/L 107   CO2 mmol/L 23.0   BUN mg/dL 13   CREATININE mg/dL 0.71   CALCIUM mg/dL 9.2   BILIRUBIN mg/dL 0.7   ALK PHOS U/L 165*   ALT (SGPT) U/L 21   AST (SGOT) U/L 24   GLUCOSE mg/dL 132*       Assessment 1)severe preeclampsia postpartum    Plan 1)admit   2)magnesium/antihypertensives   3)discussed with Dr. Forester Jayy Zapata MD  2/19/2025  21:33 EST                                                                       Electronically signed by Jayy Zapata MD at 02/19/25 2138       Vital Signs (last 7 days) before discharge       Date/Time Temp Temp src Pulse Resp BP Patient Position SpO2    02/22/25 1017 -- -- 63 16 137/91 -- --    02/22/25 0757 97.9 (36.6) -- 57 16 147/96 -- --    02/22/25 0644 -- -- 63 -- 138/93 -- --    02/22/25 0643 -- -- 63 -- 138/93 -- --    02/22/25 0517 -- -- 61 16 126/83 -- --    02/22/25 0409 -- -- 50 16 164/84 -- --    02/22/25 0302 -- -- 53 16 143/75 Lying --    02/21/25 2312 -- -- 53 -- 150/83 -- --    02/21/25 2220 -- -- -- -- -- -- --    Comment rows:    OBSERV: pt breastfeeding at 02/21/25 2220 02/21/25 1952 97.9 (36.6) Oral 67 18 112/69 Sitting --    02/21/25 1824 -- -- 60 -- 130/83 -- --    02/21/25 1814 -- -- 61 -- 133/85 -- --    02/21/25 1804 -- -- 52 -- 158/97 -- --    02/21/25 1754 -- -- 53 -- 169/100 -- --    02/21/25 1732 -- -- 51 -- 177/103 -- --    02/21/25 1554 -- -- 50 -- 163/87 -- --    02/21/25 1548 97.9 (36.6) Oral -- -- -- -- --    02/21/25 1348 -- -- -- -- -- -- --    Comment rows:    OBSERV: Pharmacy called for Imitrex dose request at 02/21/25 1348    02/21/25 1320 -- -- 75 -- 103/62 -- --    02/21/25 1210 98.1 (36.7) Oral 55 18 144/95 Sitting --    02/21/25 0801 98.5 (36.9) Oral 60 18 128/83 -- --    02/21/25 0346 98.1 (36.7) Oral 61 18  129/82 Lying --    25 0158 -- -- 71 -- 121/81 -- --    25 2344 97.8 (36.6) Oral 70 16 133/91 Lying --    258 -- -- 66 18 134/93 Lying 99    258 -- -- 68 -- 142/99 -- 99    25 -- -- 86 18 132/97 Lying 97    Comment rows:    OBSERV: Patient holding crying  while attempting to breastfeed at 25 97.9 (36.6) Oral 77 18 118/81 Lying 97    25 192 -- -- 81 -- -- -- 97    25 1831 -- -- 77 16 126/83 -- 96    25 1733 -- -- 80 16 136/97 -- 97    Comment rows:    OBSERV: pt just finished pumping at 25 1733    25 1631 97.8 (36.6) Oral 78 16 125/87 -- 97    25 1534 -- -- 85 16 116/76 Lying --    25 1433 -- -- 82 16 115/75 -- 97    25 1332 -- -- 95 16 118/77 -- 97    25 1233 -- -- 73 16 121/83 -- 97    25 1131 98.1 (36.7) Oral 83 16 121/81 -- 97    25 1033 -- -- 73 16 118/74 -- 96    Comment rows:    OBSERV: patient denied more pain medicine for headache at this time; Dr. Prince with prn order for percocet which pt declined; reviewed Dr. Guerra plan with pt regarding possible need for neuro consult or MRI if headache worsens; pt verbalized understanding with no further questions or needs at this time at 25 1033    25 0931 -- -- 84 16 122/82 -- 97    25 0849 -- -- -- -- -- -- --    Comment rows:    OBSERV: Bedside echo being performed at 25 0849    25 0831 -- -- 85 16 121/83 -- 96    25 0730 -- -- 71 16 120/77 -- 98    25 0627 -- -- 83 15 116/78 -- 97    25 0540 -- -- 76 16 113/72 -- 96    25 0435 97.8 (36.6) Oral 69 18 115/78 Lying 98    25 0331 -- -- 69 16 116/80 Lying 97    25 0230 -- -- 73 16 109/68 Lying 98    25 0130 -- -- 67 16 117/75 Lying 99    25 0030 -- -- 68 16 124/78 Lying 98    25 2334 97.9 (36.6) Oral 64 18 115/74 Lying 97    25 2240 -- -- 65 16 123/77 Lying 97    25 2204 -- -- 94  -- -- -- 96    02/19/25 2203 -- -- 81 -- -- -- 96    02/19/25 2201 -- -- 75 -- 130/83 -- 94    02/19/25 2200 -- -- 75 -- 124/84 -- 95    02/19/25 2159 -- -- 81 -- -- -- 96    02/19/25 2158 -- -- 80 -- -- -- 96    02/19/25 2157 -- -- 83 -- -- -- 94    02/19/25 2156 -- -- 78 -- -- -- 91    02/19/25 2155 -- -- 78 16 129/90 Lying 94    02/19/25 2154 -- -- 74 -- -- -- 96    02/19/25 2153 -- -- 80 -- -- -- 96    02/19/25 2152 -- -- 87 -- -- -- 96    02/19/25 2151 -- -- -- -- -- -- 94    02/19/25 2150 -- -- 82 18 138/91 Lying 96    02/19/25 2149 -- -- 71 -- -- -- 96    02/19/25 2148 -- -- 70 -- -- -- 96    02/19/25 2147 -- -- 76 -- -- -- 97    02/19/25 2146 -- -- 70 -- -- -- 94    02/19/25 2145 -- -- 74 18 140/95 Lying 95    02/19/25 2144 -- -- 69 -- -- -- 95    02/19/25 2143 -- -- 72 -- 145/91 -- 97    02/19/25 2142 -- -- 73 -- -- -- 98    02/19/25 2141 -- -- 68 -- -- -- 96    02/19/25 2140 -- -- 67 -- -- -- 97    02/19/25 2139 -- -- 68 -- -- -- 98    02/19/25 2138 -- -- 68 -- -- -- 97    02/19/25 2137 -- -- 64 -- -- -- 98    02/19/25 2136 -- -- 65 -- -- -- 97    02/19/25 2135 -- -- 62 -- -- -- 97    02/19/25 2134 -- -- 64 -- -- -- 98    02/19/25 2133 -- -- 60 -- -- -- 97    02/19/25 2132 -- -- 63 -- -- -- 96    02/19/25 2131 -- -- 63 -- -- -- 96    02/19/25 2130 -- -- 72 -- -- -- 98    02/19/25 2129 -- -- 64 -- -- -- 98    02/19/25 2128 -- -- 58 -- -- -- 97    02/19/25 2127 -- -- 61 -- -- -- 98    02/19/25 2126 -- -- 68 -- -- -- 98    02/19/25 2125 -- -- 63 -- -- -- 98    02/19/25 2124 -- -- 63 -- -- -- 98    02/19/25 2123 -- -- 59 -- -- -- 97    02/19/25 2122 -- -- 57 -- -- -- 97 02/19/25 2121 -- -- 62 -- -- -- 96    02/19/25 2120 97.5 (36.4) Oral 59 18 174/109 Lying 96    02/19/25 2119 -- -- 59 -- -- -- 97    02/19/25 2118 -- -- 65 -- -- -- 97    02/19/25 2117 -- -- 61 -- -- -- 96    02/19/25 2116 -- -- 59 -- -- -- 96    02/19/25 2115 -- -- -- -- 177/109 -- --    02/19/25 2114 -- -- 58 -- -- -- 97    02/19/25 2113  -- -- 59 -- -- -- 97    02/19/25 2112 -- -- 59 -- -- -- 97    02/19/25 2111 -- -- 56 -- -- -- 96    02/19/25 2110 -- -- 60 -- 173/111 -- 96    02/19/25 2109 -- -- 58 -- -- -- 96    02/19/25 2108 -- -- 59 -- -- -- 97    02/19/25 2107 -- -- 59 -- -- -- 97    02/19/25 2106 -- -- 56 -- -- -- 97    02/19/25 2105 -- -- 59 -- 171/110 -- 98    02/19/25 2104 -- -- 56 -- -- -- 98    02/19/25 2103 -- -- 64 -- -- -- 99    02/19/25 2102 -- -- -- -- 178/108 -- --    02/19/25 2101 -- -- 62 -- -- -- 98    02/19/25 2100 -- -- 61 -- -- -- 97    02/19/25 2059 -- -- 64 -- -- -- 98    02/19/25 2058 -- -- 60 -- -- -- 98    02/19/25 2057 -- -- 62 -- -- -- 98    02/19/25 2056 -- -- 63 -- -- -- 97    02/19/25 2055 -- -- 66 -- -- -- 97    02/19/25 2054 -- -- 56 -- -- -- 97    02/19/25 2053 -- -- 61 -- -- -- 98    02/19/25 2052 -- -- 63 -- -- -- 98    02/19/25 2051 -- -- 57 -- -- -- 97    02/19/25 2050 -- -- 59 -- 165/106 -- 97    02/19/25 2049 -- -- 67 -- -- -- 96    02/19/25 2048 -- -- 57 -- -- -- 97    02/19/25 2047 -- -- 57 -- -- -- 96    02/19/25 2046 -- -- 58 -- -- -- 97    02/19/25 2045 -- -- 60 -- 161/101 -- 97    02/19/25 2044 -- -- 56 -- -- -- 97    02/19/25 2043 -- -- 57 -- -- -- 96    02/19/25 2042 -- -- 56 -- -- -- 97    02/19/25 2041 -- -- 56 -- -- -- 97    02/19/25 2040 -- -- 59 -- -- -- 97    02/19/25 2039 -- -- 59 -- 146/102 -- 97          Facility-Administered Medications as of 2/22/2025   Medication Dose Route Frequency Provider Last Rate Last Admin    [COMPLETED] butalbital-acetaminophen-caffeine (FIORICET, ESGIC) -40 MG per tablet 2 tablet  2 tablet Oral Once High, Jayy SCHWARTZ MD   2 tablet at 02/20/25 4150    [COMPLETED] cyclobenzaprine (FLEXERIL) tablet 10 mg  10 mg Oral Once PRN Tiffani Prince MD   10 mg at 02/20/25 1256    [COMPLETED] ketorolac (TORADOL) injection 30 mg  30 mg Intravenous Once High, Jayy SCHWARTZ MD   30 mg at 02/19/25 2302    [COMPLETED] ketorolac (TORADOL) injection 30 mg  30 mg Intravenous Once  Hannah Noguera MD   30 mg at 02/21/25 0023    [COMPLETED] labetalol (NORMODYNE) tablet 200 mg  200 mg Oral Once Tiffani Prince MD   200 mg at 02/21/25 1617    [COMPLETED] magnesium sulfate bolus from bag 0.04 g/mL solution 4 g  4 g Intravenous Once High, Jayy SCHWARTZ MD   4 g at 02/19/25 2142    [COMPLETED] NIFEdipine (PROCARDIA) capsule 10 mg  10 mg Oral Once High, Jayy SCHWARTZ MD   10 mg at 02/19/25 2117    [COMPLETED] NIFEdipine (PROCARDIA) capsule 10 mg  10 mg Oral Once Tiffani Prince MD   10 mg at 02/21/25 1744    [COMPLETED] NIFEdipine (PROCARDIA) capsule 10 mg  10 mg Oral Once Tiffani Prince MD   10 mg at 02/22/25 0426    [COMPLETED] oxyCODONE-acetaminophen (PERCOCET) 5-325 MG per tablet 1 tablet  1 tablet Oral Once High, Jayy SCHWARTZ MD   1 tablet at 02/20/25 0043    [COMPLETED] oxyCODONE-acetaminophen (PERCOCET) 5-325 MG per tablet 1 tablet  1 tablet Oral Once PRN Tiffani Prince MD   1 tablet at 02/22/25 0431     Orders (all)        Start     Ordered    02/22/25 1123  Discharge patient  Once         02/22/25 1124    02/22/25 0900  NIFEdipine XL (PROCARDIA XL) 24 hr tablet 30 mg  2 Times Daily,   Status:  Discontinued         02/22/25 0422    02/22/25 0803  Diet: Regular/House; Fluid Consistency: Thin (IDDSI 0)  Diet Effective Now,   Status:  Canceled         02/22/25 0802    02/22/25 0803  DIET MESSAGE Please send eggs,, biscuit, gravy, sausage, coffee  Once,   Status:  Canceled        Comments: Please send eggs,, biscuit, gravy, sausage, coffee    02/22/25 0802    02/22/25 0515  NIFEdipine (PROCARDIA) capsule 10 mg  Once         02/22/25 0419    02/22/25 0000  labetalol (NORMODYNE) 200 MG tablet  Every 8 Hours Scheduled         02/22/25 1124    02/22/25 0000  NIFEdipine CC (ADALAT CC) 30 MG 24 hr tablet  2 Times Daily         02/22/25 1124    02/22/25 0000  Diet: Regular/House Diet; Regular Texture (IDDSI 7); Pudding Thick         02/22/25 1124    02/22/25 0000  Activity as Tolerated         02/22/25 1124     02/22/25 0000  Discharge Follow-up with Specified Provider: Dr. Awad this week         02/22/25 1124    02/21/25 2332  Notify Provider (Specify)  Continuous,   Status:  Canceled        Comments: Notify MD if BP is greater than 160/110 per Dr. Prince    02/21/25 2332    02/21/25 2322  HYDROcodone-acetaminophen (NORCO) 5-325 MG per tablet 1 tablet  Every 6 Hours PRN,   Status:  Discontinued         02/21/25 2323    02/21/25 2200  labetalol (NORMODYNE) tablet 200 mg  Every 8 Hours Scheduled,   Status:  Discontinued         02/21/25 1601    02/21/25 1830  NIFEdipine (PROCARDIA) capsule 10 mg  Once         02/21/25 1736    02/21/25 1830  NIFEdipine XL (PROCARDIA XL) 24 hr tablet 30 mg  Every 24 Hours Scheduled,   Status:  Discontinued         02/21/25 1736    02/21/25 1700  labetalol (NORMODYNE) tablet 200 mg  Once         02/21/25 1601    02/21/25 1341  Discharge patient  Once,   Status:  Canceled         02/21/25 1342    02/21/25 1340  SUMAtriptan (IMITREX) tablet 50 mg  Every 2 Hours PRN,   Status:  Discontinued         02/21/25 1341    02/21/25 0837  MRI Angiogram Venogram Head  1 Time Imaging         02/21/25 0836    02/21/25 0115  labetalol (NORMODYNE) tablet 200 mg  2 Times Daily,   Status:  Discontinued         02/21/25 0018    02/21/25 0100  ketorolac (TORADOL) injection 30 mg  Once         02/21/25 0018    02/21/25 0015  Notify Provider (Specify)  Continuous,   Status:  Canceled        Comments: Notify MD if BP is greater than 150/100    02/21/25 0018    02/21/25 0000  labetalol (NORMODYNE) 200 MG tablet  2 Times Daily,   Status:  Discontinued         02/21/25 1342    02/20/25 1604  Apply Heat To Affected Area  Once,   Status:  Canceled         02/20/25 1603    02/20/25 1540  acetaminophen (TYLENOL) tablet 1,000 mg  Every 6 Hours PRN,   Status:  Discontinued         02/20/25 1544    02/20/25 1239  cyclobenzaprine (FLEXERIL) tablet 10 mg  Once As Needed         02/20/25 1240    02/20/25 1145   oxyCODONE-acetaminophen (PERCOCET) 5-325 MG per tablet 1 tablet  Once As Needed         02/20/25 1148    02/20/25 1100  oxyCODONE-acetaminophen (PERCOCET) 5-325 MG per tablet 1 tablet  Once,   Status:  Discontinued         02/20/25 1014    02/20/25 0915  metoclopramide (REGLAN) injection 10 mg  Every 6 Hours,   Status:  Discontinued         02/20/25 0827    02/20/25 0846  Admit To Obstetrics Inpatient  Once         02/20/25 0846    02/20/25 0845  butalbital-acetaminophen-caffeine (FIORICET, ESGIC) -40 MG per tablet 2 tablet  Once         02/20/25 0749    02/20/25 0841  metoclopramide (REGLAN) injection 10 mg  Every 6 Hours PRN,   Status:  Discontinued         02/20/25 0842    02/20/25 0819  Adult Transthoracic Echo Complete W/ Cont if Necessary Per Protocol  Once         02/20/25 0818    02/20/25 0633  Diet: Regular/House; Fluid Consistency: Thin (IDDSI 0)  Diet Effective Now,   Status:  Canceled         02/20/25 0633    02/20/25 0130  oxyCODONE-acetaminophen (PERCOCET) 5-325 MG per tablet 1 tablet  Once         02/20/25 0035    02/19/25 2345  ketorolac (TORADOL) injection 30 mg  Once         02/19/25 2251    02/19/25 2300  dextrose 5 % and sodium chloride 0.45 % infusion  Continuous,   Status:  Discontinued         02/19/25 2211 02/19/25 2203  Urinalysis, Microscopic Only - Urine, Clean Catch  Once         02/19/25 2202 02/19/25 2200  magnesium sulfate bolus from bag 0.04 g/mL solution 4 g  Once         02/19/25 2105 02/19/25 2200  magnesium sulfate 20 GM/500ML infusion  Continuous,   Status:  Discontinued         02/19/25 2105 02/19/25 2200  sodium chloride 0.9 % flush 10 mL  Every 12 Hours Scheduled,   Status:  Discontinued         02/19/25 2105 02/19/25 2200  NIFEdipine (PROCARDIA) capsule 10 mg  Once         02/19/25 2106 02/19/25 2200  lactated ringers infusion  Continuous,   Status:  Discontinued         02/19/25 2108 02/19/25 2115  ondansetron (ZOFRAN) injection 4 mg  Every 6 Hours  PRN,   Status:  Discontinued         02/19/25 2116 02/19/25 2104  Insert Peripheral IV  Once,   Status:  Canceled         02/19/25 2105 02/19/25 2104  PERIPHERAL IV CARE - Connectors / Hubs Must Be Scrubbed 15 Seconds Using 70% Alcohol & Allowed to Dry Before Accessing Line  Continuous,   Status:  Canceled         02/19/25 2105 02/19/25 2103  PERIPHERAL IV CARE - Change Dressing As Needed When Damp, Loose or Soiled  As Needed,   Status:  Canceled       02/19/25 2105 02/19/25 2103  sodium chloride 0.9 % flush 10 mL  As Needed,   Status:  Discontinued         02/19/25 2105 02/19/25 2103  Change Peripheral IV Site  As Needed,   Status:  Canceled      Comments: Frequency Per Facility Policy    02/19/25 2105 02/19/25 2103  calcium gluconate injection 1 g  Once As Needed,   Status:  Discontinued         02/19/25 2105 02/19/25 2047  Urinalysis With Microscopic If Indicated (No Culture) - Urine, Clean Catch  Once         02/19/25 2046 02/19/25 2047  POC Protein, Urine, Qualitative, Dipstick  Once         02/19/25 2046 02/19/25 2046  CBC (No Diff)  STAT         02/19/25 2046 02/19/25 2046  Comprehensive Metabolic Panel  STAT         02/19/25 2046    Signed and Held  Vital Signs  Every 15 Minutes,   Status:  Canceled         Signed and Held    Signed and Held  Continuous Pulse Oximetry  Continuous,   Status:  Canceled         Signed and Held    Signed and Held  Check Clonus (DTRs) With Vitals  Per Hospital Policy/Protocol,   Status:  Canceled         Signed and Held    Signed and Held  Assess Lung Sounds  Per Hospital Policy/Protocol,   Status:  Canceled         Signed and Held    Signed and Held  Assess LOC With Vitals  Per Hospital Policy/Protocol,   Status:  Canceled         Signed and Held    Signed and Held  Strict I and O  Every Shift,   Status:  Canceled       Signed and Held    Signed and Held  Total IV Fluids Should Equal 125 mL/hour  Continuous,   Status:  Canceled         Signed and  "Held    Signed and Held  Notify Provider of Suspected Magnesium Toxicity  Until Discontinued,   Status:  Canceled         Signed and Held    Signed and Held  Insert Peripheral IV  Once,   Status:  Canceled         Signed and Held    Signed and Held  Saline Lock & Maintain IV Access  Continuous,   Status:  Canceled         Signed and Held    Signed and Held  sodium chloride 0.9 % flush 10 mL  Every 12 Hours Scheduled,   Status:  Canceled         Signed and Held    Signed and Held  sodium chloride 0.9 % flush 10 mL  As Needed,   Status:  Canceled         Signed and Held    Signed and Held  sodium chloride 0.9 % infusion 40 mL  As Needed,   Status:  Canceled         Signed and Held    Signed and Held  dextrose 5 % and sodium chloride 0.45 % infusion  Continuous,   Status:  Canceled         Signed and Held    Signed and Held  ondansetron (ZOFRAN) injection 4 mg  Every 6 Hours PRN,   Status:  Canceled         Signed and Held    Signed and Held  hydrALAZINE (APRESOLINE) injection 5-10 mg  Every 20 Minutes PRN,   Status:  Canceled        Placed in \"Or\" Linked Group    Signed and Held    Signed and Held  labetalol (NORMODYNE,TRANDATE) injection 20-80 mg  Every 10 Minutes PRN,   Status:  Canceled        Placed in \"Or\" Linked Group    Signed and Held    Signed and Held  NIFEdipine (PROCARDIA) capsule 10-20 mg  Every 20 Minutes PRN,   Status:  Canceled        Placed in \"Or\" Linked Group    Signed and Held    Signed and Held  NIFEdipine XL (PROCARDIA XL) 24 hr tablet 30 mg  Every 24 Hours Scheduled,   Status:  Canceled         Signed and Held    Signed and Held  Diet: Regular/House; Fluid Consistency: Thin (IDDSI 0)  Diet Effective Now,   Status:  Canceled         Signed and Held    Signed and Held  Activity As Tolerated  Until Discontinued,   Status:  Canceled         Signed and Held    Signed and Held  ibuprofen (ADVIL,MOTRIN) tablet 600 mg  Every 6 Hours PRN,   Status:  Canceled         Signed and Held    Signed and Held  " acetaminophen (TYLENOL) tablet 1,000 mg  Every 6 Hours PRN,   Status:  Canceled         Signed and Held                     Physician Progress Notes (all)        Jerome Moncada MD at 25 1105           [place]  Ariane Vasquez  : 1995  MRN: [id number]  CSN: [id number]    Hospital Day: 4    Postpartum Day #8  Subjective     CC: hospital follow-up    Still has a headache but it is improved.  She does think she was ready to go home    Objective     Patient Vitals for the past 24 hrs:   BP   25 1017 137/91   25 0757 147/96   25 0644 138/93   25 0643 138/93   25 0517 126/83   25 0409 164/84   25 0302 143/75   25 2312 150/83   25 1952 112/69   25 1824 130/83   25 1814 133/85   25 1804 158/97   25 1754 169/100   25 1732 (!) 177/103   25 1554 163/87   25 1548 --   25 1320 103/62   25 1210 144/95        Min/max vitals past 24 hours:  Temp  Min: 97.9 °F (36.6 °C)  Max: 98.1 °F (36.7 °C)  BP  Min: 103/62  Max: 177/103  Pulse  Min: 50  Max: 75  Resp  Min: 16  Max: 18        General: well developed; well nourished  no acute distress   Abdomen: fundus firm and non-tender   Pelvic: Not performed   Ext: Calves NT     Results from last 7 days   Lab Units 25  2145 25   WBC 10*3/mm3 7.64 8.43   HEMOGLOBIN g/dL 14.0 13.7   HEMATOCRIT % 39.8 39.9   PLATELETS 10*3/mm3 251 218     Lab Results   Component Value Date    RH Positive 2025    HEPBSAG Negative 2024             Assessment   Postpartum headache with normal imaging and lab work not consistent with HELLP syndrome  Postpartum hypertension better controlled on labetalol 200 3 times daily and Procardia XL 30 mg BID  Postpartum Day #8 S/P vaginal delivery    Plan   Okay for discharge to home  Would discourage checking of home blood pressures  Follow-up appointment early this week with Dr. Prince to recheck blood  pressure      Jerome Moncada MD  2/22/2025  11:05 EST                  Electronically signed by Jerome Moncada MD at 02/22/25 1125       Tiffani Prince MD at 02/21/25 0837          2/21/2025  PPD #7    Subjective   Ariane reports persistant and bothersome headache, specifically on left side.  Patient describes her lochia less than menses.         Objective   Temp: Temp:  [97.8 °F (36.6 °C)-98.5 °F (36.9 °C)] 98.5 °F (36.9 °C) Temp src: Oral   BP: BP: (115-142)/(74-99) 128/83        Pulse: Heart Rate:  [60-95] 60  RR: Resp:  [16-18] 18    General:  No acute distress   Abdomen: Fundus firm and beneath umbilicus   Pelvis: deferred     Lab Results   Component Value Date    WBC 7.64 02/19/2025    HGB 14.0 02/19/2025    HCT 39.8 02/19/2025    MCV 92.6 02/19/2025     02/19/2025    HEPBSAG Negative 07/11/2024         Assessment/ plan  PPD# 7 after vaginal delivery, readmitted for PP preeclampsia. She is now s/p 24h PP magnesium, her BPs are well controlled on labetelol, labs normal, but her headache has not resolved. Will image MRA/V. If normal, can consider home later today.           This note has been electronically signed.    Tiffani Prince MD  February 21, 2025    Electronically signed by Tiffani Prince MD at 02/21/25 0842       Tiffani Prince MD at 02/20/25 0846          2/20/2025  PPD #6    Subjective   Admitted last PM with severe range BPs, started on magnesium.   Ariane feels better since starting the mag. She reports she still has a headache but it did improve when starting the mag. .  Patient describes her lochia less than menses.  Pain is well controlled    She had a planned outpatient echo today ordered per cards for an elevated bnp.  She is wearing  holter monitor. She had some pp bradycardia. Pulse currently 85.        Objective   Temp: Temp:  [97.5 °F (36.4 °C)-97.9 °F (36.6 °C)] 97.8 °F (36.6 °C) Temp src: Oral   BP: BP: (109-178)/() 121/83        Pulse: Heart Rate:  [52-94]  85  RR: Resp:  [15-18] 16    General:  No acute distress   Abdomen: Fundus firm and beneath umbilicus   Pelvis: deferred     Lab Results   Component Value Date    WBC 7.64 2025    HGB 14.0 2025    HCT 39.8 2025    MCV 92.6 2025     2025    HEPBSAG Negative 2024     Results from last 7 days   Lab Units 25  1930   TREPONEMA PALLIDUM AB TOTAL  Non-Reactive     Assessment/ plan  PPD# 6 after vaginal delivery, readmitted with PP preeclampsia. On mag. BP normal on no meds. Diuresing well. Labs normal.   Will cont her cardiac eval as inpatient.   Will monitor her HA. If doesn't improve, may need MRI head..           This note has been electronically signed.    Tiffani Prince MD  2025    Electronically signed by Tiffani Prince MD at 25 0845          Discharge Summary        Tiffani Prince MD at 25 1342          Discharge Summary    Date of Admission: 2025  Date of Discharge:  2025      Patient: Ariane Vasquez      MR#:[id number]    Delivery Provider: Hannah Noguera    Discharge Surgeon/OB:ria    Presenting Problem/History of Present Illness  Preeclampsia in postpartum period [O14.95]       Preeclampsia in postpartum period         Discharge Diagnosis: PP preeclampsia    Procedures:  PP magnesium  MRI    Discharge Date: 2025;     Hospital Course  Patient is a 30 y.o. female  at 39w5d status post vaginal delivery without complication. She was readmitted PPD 6 with severe range BPs and headache. She received 24h PP magneisum, her BP was controlled on PO labetelol. She did cont to have a mild headache, MRA/V was performed and was negative for venous sinus thrombosis. She was ready for DC in stable condition. FU 1 week      Infant:   female fetus 3990 g (8 lb 12.7 oz) with Apgar scores of 8 , 9  at five minutes.    Condition on Discharge:  Stable    Vital Signs  Temp:  [97.8 °F (36.6 °C)-98.5 °F (36.9 °C)] 98.1 °F (36.7  °C)  Heart Rate:  [55-86] 55  Resp:  [16-18] 18  BP: (115-144)/(75-99) 144/95    Lab Results   Component Value Date    WBC 7.64 2025    HGB 14.0 2025    HCT 39.8 2025    MCV 92.6 2025     2025       Discharge Disposition  Home or Self Care    Discharge Medications     Discharge Medications        New Medications        Instructions Start Date   labetalol 200 MG tablet  Commonly known as: NORMODYNE   200 mg, Oral, 2 Times Daily             Continue These Medications        Instructions Start Date   ibuprofen 600 MG tablet  Commonly known as: ADVIL,MOTRIN   600 mg, Oral, Every 6 Hours PRN      PRENATAL 1 + IRON PO   Take  by mouth.      Stool Softener 100 MG capsule  Generic drug: docusate sodium   100 mg, Oral, 2 Times Daily             Stop These Medications      polyethylene glycol 17 GM/SCOOP powder  Commonly known as: MIRALAX              Discharge Diet:     Activity at Discharge:     Follow-up Appointments  Future Appointments   Date Time Provider Department Center   3/19/2025  1:30 PM Kayley Maldonado APRN [facility] Prince MD  25  13:42 EST  [facility]          Electronically signed by Tiffani Prince MD at 25 0431       Jerome Moncada MD at 25 1127          [facility]   Ariane Vasquez  : 1995  MRN: [id number]  CSN: [id number]    Discharge Summary    A formal discharge summary was not needed because this admission was for an observation visit.    Discharge Date: 25   Discharge Dx:    Postpartum headache  Postpartum hypertension improved   Disposition: Home   Condition at discharge: Improved   Follow up: Early this week for blood pressure recheck         This note has been electronically signed.    Jerome Moncada MD      Electronically signed by Jerome Moncada MD at 25 [phone]

## 2025-02-25 ENCOUNTER — MATERNAL SCREENING (OUTPATIENT)
Dept: CALL CENTER | Facility: HOSPITAL | Age: 30
End: 2025-02-25
Payer: OTHER GOVERNMENT

## 2025-02-25 NOTE — OUTREACH NOTE
Maternal Screening Survey      Flowsheet Row Responses   Facility patient discharged from? [place]   Attempt successful? Yes   Call start time 1100   Call end time 1102   I have been able to laugh and see the funny side of things. 0   I have looked forward with enjoyment to things. 0   I have blamed myself unnecessarily when things went wrong. 0   I have been anxious or worried for no good reason. 0   I have felt scared or panicky for no good reason. 0   Things have been getting on top of me. 0   I have been so unhappy that I have had difficulty sleeping. 0   I have felt sad or miserable. 0   I have been so unhappy that I have been crying. 0   The thought of harming myself has occurred to me. 0   [place]  Depression Scale Total 0   Did any of your parents have problems with alcohol or drug use? No   Do any of your peers have problems with alcohol or drug use? No   Does your partner have problems with alcohol or drug use? No   Before you were pregnant did you have problems with alcohol or drug use? (past) No   In the past month, did you drink beer, wine, liquor or use any other drugs? (pregnancy) No   Maternal Screening call completed Yes   Call end time 1102              Jonelle GUNTER - Registered Nurse

## 2025-03-03 ENCOUNTER — POSTPARTUM VISIT (OUTPATIENT)
Dept: OBSTETRICS AND GYNECOLOGY | Facility: CLINIC | Age: 30
End: 2025-03-03
Payer: OTHER GOVERNMENT

## 2025-03-03 VITALS — SYSTOLIC BLOOD PRESSURE: 108 MMHG | WEIGHT: 152 LBS | BODY MASS INDEX: 23.8 KG/M2 | DIASTOLIC BLOOD PRESSURE: 64 MMHG

## 2025-03-03 RX ORDER — LABETALOL 200 MG/1
200 TABLET, FILM COATED ORAL 2 TIMES DAILY
Start: 2025-03-03

## 2025-03-03 NOTE — PROGRESS NOTES
Chief Complaint   Patient presents with    Postpartum Care       Postpartum Visit         Ariane Vasquez is a 30 y.o.  who presents today for a 3 week postpartum BP check.     C/S: no     Vaginal, Spontaneous   Information for the patient's :  Niranjan Kim [[id number]]   2025   female   Niranjan Kim   3990 g (8 lb 12.7 oz)   Gestational Age: 39w5d       Baby Discharged: Discharged with Mom  Delivering Physician: Hannah Noguera MD    Her pregnancy was complicated by severe postpartum preeclampsia. Patient is still taking Labetalol 200mg three times daily and Nifedipine 30mg twice daily. She is not currently checking her BP at home due to being asymptomatic. The episiotomy is healing well. Patient describes vaginal bleeding as moderate.  Patient is breastfeeding.      She would like to discuss the following complaints today: nothing.    Patient denies concerns for postpartum depression/anxiety. Patient denies  suicidal or homicidal ideation. Her postpartum depression screening questionnaire: 3. No treatment is indicated      Last Pap : 24. Results: negative. HPV: negative.   Last Completed Pap Smear       This patient has no relevant Health Maintenance data.              The additional following portions of the patient's history were reviewed and updated as appropriate: allergies and current medications.    Review of Systems   Constitutional: Negative.    Respiratory: Negative.     Cardiovascular: Negative.    Gastrointestinal: Negative.    Genitourinary: Negative.    Psychiatric/Behavioral: Negative.       All other systems reviewed and are negative.     I have reviewed and agree with the HPI, ROS, and historical information as entered above. Ifrah Kohli, CATHERINE      /64   Wt 68.9 kg (152 lb)   LMP 2024   Breastfeeding Yes   BMI 23.80 kg/m²     Physical Exam  Vitals and nursing note reviewed.   Constitutional:       Appearance: Normal appearance. She is normal  weight.   Pulmonary:      Effort: Pulmonary effort is normal.   Musculoskeletal:         General: Normal range of motion.      Right lower leg: No edema.      Left lower leg: No edema.   Neurological:      Mental Status: She is alert and oriented to person, place, and time.                 Assessment and Plan    Problem List Items Addressed This Visit       Preeclampsia in postpartum period - Primary    Relevant Medications    labetalol (NORMODYNE) 200 MG tablet    NIFEdipine CC (ADALAT CC) 30 MG 24 hr tablet       S/p Vaginal delivery, 3 weeks postpartum.  Doing well.    Discussed weaning meds with Dr. Prince- Start monitoring BP twice daily, decrease labetalol to 200mg twice daily for 1-2 days, and if BP still <140/90, can decrease to 100mg twice daily for 1-2 days. If still normal can come off labetalol altogether by the next visit. At that point may be able to decrease procardia to once daily.   Return in about 1 week (around 3/10/2025) for BP check.     CATHERINE Owen  03/03/2025

## 2025-03-11 ENCOUNTER — POSTPARTUM VISIT (OUTPATIENT)
Dept: OBSTETRICS AND GYNECOLOGY | Facility: CLINIC | Age: 30
End: 2025-03-11
Payer: OTHER GOVERNMENT

## 2025-03-11 VITALS
HEIGHT: 67 IN | BODY MASS INDEX: 23.79 KG/M2 | SYSTOLIC BLOOD PRESSURE: 112 MMHG | WEIGHT: 151.6 LBS | DIASTOLIC BLOOD PRESSURE: 82 MMHG

## 2025-03-11 RX ORDER — AMOXICILLIN 500 MG
CAPSULE ORAL
COMMUNITY

## 2025-03-11 NOTE — PROGRESS NOTES
"      Chief Complaint   Patient presents with    Postpartum Care       Postpartum blood pressure check visit         Ariane Vasquez is a 30 y.o.  who presents today for a blood pressure check.    Vaginal, Spontaneous   Information for the patient's :  Niranjan Kim [[id number]]   2025   female   Niranjan Kim   3990 g (8 lb 12.7 oz)   Gestational Age: 39w5d     Baby Discharged: Discharged with Mom  Delivering Physician: Hannah Noguera MD    The patient was diagnosed with  severe Postpartum preeclampsia.  The patient was taking Labetalol 200mg three times daily and Nifedipine 30mg twice daily. Patient was instructed on how  to wean off her Labetalol at her last visit, and she dropped down to 200mg BID for a few days, then 100mg BID, then went to once daily, and finally stopped altogether last night.   Her BP has been averaging 120-130/80's since discharge from the hospital. The patient has no complaints.  The patient denies Headache, Right upper quadrant/ epigastric pain, Vision changes, and Swelling.     Patient describes her vaginal bleeding as moderate.  Patient is breast feeding. She denies bowel or bladder issues.    Patient denies postpartum depression. Postpartum Depression Screening Questionnaire: 1, no treatment indicated.      The additional following portions of the patient's history were reviewed and updated as appropriate: allergies and current medications.      Review of Systems   Constitutional: Negative.    Respiratory: Negative.     Cardiovascular: Negative.    Gastrointestinal: Negative.    Genitourinary:  Positive for vaginal bleeding.   Psychiatric/Behavioral: Negative.       All other systems reviewed and are negative.     I have reviewed and agree with the HPI, ROS, and historical information as entered above. CATHERINE Owen      Objective   /82 (BP Location: Right arm, Patient Position: Sitting, Cuff Size: Adult)   Ht 170.3 cm (67.05\")   Wt 68.8 kg (151 lb " 9.6 oz)   LMP 05/12/2024   Breastfeeding Yes   BMI 23.71 kg/m²     Physical Exam  Vitals and nursing note reviewed.   Constitutional:       Appearance: Normal appearance. She is normal weight.   Pulmonary:      Effort: Pulmonary effort is normal.   Musculoskeletal:         General: Normal range of motion.      Right lower leg: No edema.      Left lower leg: No edema.   Neurological:      Mental Status: She is alert and oriented to person, place, and time.              Assessment and Plan    Problem List Items Addressed This Visit       Preeclampsia in postpartum period - Primary    Overview   PP visit 3/3/25- Will decrease labetalol to 200mg twice daily for 1-2 days, and if BP still <140/90, can decrease to 100mg twice daily for 1-2 days. If still normal can come off labetalol altogether          Relevant Medications    NIFEdipine CC (ADALAT CC) 30 MG 24 hr tablet       S/p vaginal delivery, postpartum preeclampsia.   Pediatrician ordered a Lactation consultation, will call for appt.   Continue monitoring BP at home. Call if >140/90, Instructions given for weaning medication, and PP pre-eclampsia signs reviewed. Will wait a few days since she just stopped labetalol last night, then she can go to once a day on the procardia. After 2-3 days if her BP's are still normal, can come off procardia.   F/u at next sched visit with Dr. Prince, or if BP's are elevated or having symptoms.   Return in about 2 weeks (around 3/25/2025) for Next scheduled follow up, Suresh Polk.    Ifrah Kohli, CATHERINE  03/11/2025

## 2025-03-12 ENCOUNTER — TELEPHONE (OUTPATIENT)
Dept: LACTATION | Facility: HOSPITAL | Age: 30
End: 2025-03-12
Payer: OTHER GOVERNMENT

## 2025-03-12 NOTE — TELEPHONE ENCOUNTER
Patient called for advice on pumping her breasts & increasing milk supply. Infant is 4 weeks old & gaining weight. Mother states she's nursing well. I encouraged Ariane to start pumping after feeds as often as possible throughout the day to increase milk supply. She states she has an Lisette wearable pump. I advised her if the Lisette does not increase her supply she may need to purchase an electric pump like a Spectra. Patient states she is breastfeeding q2-3 hours during the day and she is nursing around 4-5 + hours at night. I also encouraged pt to nurse more frequently at night to increase her supply. Pt was happy & appreciative for the suggestions. I reminded pt to call back if she needs further assistance.   Mary Ann RN, RNC-MNN, CLC

## 2025-03-17 ENCOUNTER — TELEPHONE (OUTPATIENT)
Dept: CARDIOLOGY | Facility: HOSPITAL | Age: 30
End: 2025-03-17
Payer: OTHER GOVERNMENT

## 2025-03-25 ENCOUNTER — POSTPARTUM VISIT (OUTPATIENT)
Dept: OBSTETRICS AND GYNECOLOGY | Facility: CLINIC | Age: 30
End: 2025-03-25
Payer: OTHER GOVERNMENT

## 2025-03-25 VITALS
DIASTOLIC BLOOD PRESSURE: 70 MMHG | SYSTOLIC BLOOD PRESSURE: 100 MMHG | WEIGHT: 148 LBS | BODY MASS INDEX: 23.23 KG/M2 | HEIGHT: 67 IN

## 2025-03-25 DIAGNOSIS — Z30.014 ENCOUNTER FOR INITIAL PRESCRIPTION OF INTRAUTERINE CONTRACEPTIVE DEVICE (IUD): ICD-10-CM

## 2025-03-25 PROCEDURE — 0503F POSTPARTUM CARE VISIT: CPT | Performed by: OBSTETRICS & GYNECOLOGY

## 2025-03-25 NOTE — PROGRESS NOTES
"        Chief Complaint   Patient presents with    Postpartum Care       Postpartum Visit         Ariane Vasquez is a 30 y.o.  who presents today for a 6 week(s) postpartum check.     C/S: no     Vaginal, Spontaneous   Information for the patient's :  Niranjan Kim [[id number]]   2025   female   Niranjan Kim   3990 g (8 lb 12.7 oz)   Gestational Age: 39w5d       Baby Discharged:  baby stayed an extra  days due to juandice. Patient was re-admitted for preeclampsia. She has now weaned off procardia.   Delivering Physician: Hannah Noguera MD    Her pregnancy was complicated by no known issues. The patient did not have a laceration or episiotomy  is healing well. Patient describes vaginal bleeding as light.  Patient is breastfeeding.  She desires Mirena for contraception.      She would like to discuss the following complaints today: none.    Patient denies concerns for postpartum depression/anxiety. Patient denies  suicidal or homicidal ideation. Her postpartum depression screening questionnaire: 5. No treatment is indicated      Last Pap : 24. Results: negative. HPV: negative. @   Last Completed Pap Smear    This patient has no relevant Health Maintenance data.           The additional following portions of the patient's history were reviewed and updated as appropriate: allergies, current medications, past family history, past medical history, past social history, past surgical history, and problem list.    Review of Systems   All other systems reviewed and are negative.    All other systems reviewed and are negative.     I have reviewed and agree with the HPI, ROS, and historical information as entered above. Tiffani Prince MD      /70   Ht 170.2 cm (67\")   Wt 67.1 kg (148 lb)   LMP 2024   Breastfeeding Yes   BMI 23.18 kg/m²     Physical Exam  Vitals and nursing note reviewed. Exam conducted with a chaperone present.   Constitutional:       Appearance: She is " well-developed.   HENT:      Head: Normocephalic and atraumatic.   Neck:      Thyroid: No thyroid mass or thyromegaly.   Pulmonary:      Breath sounds: No rhonchi.   Abdominal:      Palpations: Abdomen is soft. Abdomen is not rigid. There is no mass.      Tenderness: There is no abdominal tenderness. There is no guarding.      Hernia: No hernia is present.   Genitourinary:     Vagina: Normal.      Cervix: Normal.      Uterus: Normal.    Musculoskeletal:      Cervical back: Normal range of motion. No muscular tenderness.   Neurological:      Mental Status: She is alert and oriented to person, place, and time.   Psychiatric:         Behavior: Behavior normal.             Assessment and Plan    Problem List Items Addressed This Visit    None  Visit Diagnoses         Postpartum exam    -  Primary      Encounter for initial prescription of intrauterine contraceptive device (IUD)                S/p Vaginal delivery, 6 week(s) postpartum.  Doing well.    Return to normal physical activity.  No pelvic restrictions.   Baby doing well.  Breastfeeding going well.  No si/sx of postpartum depression  Contraception: contraceptive methods: IUD.  Insertion date: to be sched  Return in about 2 weeks (around 4/8/2025) for IUD Insertion.     Tiffani Prince MD  03/25/2025

## 2025-04-21 ENCOUNTER — OFFICE VISIT (OUTPATIENT)
Dept: OBSTETRICS AND GYNECOLOGY | Facility: CLINIC | Age: 30
End: 2025-04-21
Payer: OTHER GOVERNMENT

## 2025-04-21 VITALS
WEIGHT: 145 LBS | BODY MASS INDEX: 22.76 KG/M2 | HEIGHT: 67 IN | DIASTOLIC BLOOD PRESSURE: 64 MMHG | SYSTOLIC BLOOD PRESSURE: 110 MMHG

## 2025-04-21 DIAGNOSIS — Z30.430 ENCOUNTER FOR IUD INSERTION: Primary | ICD-10-CM

## 2025-04-21 DIAGNOSIS — Z30.431 INTRAUTERINE DEVICE SURVEILLANCE: ICD-10-CM

## 2025-04-21 LAB
B-HCG UR QL: NEGATIVE
EXPIRATION DATE: 0
INTERNAL NEGATIVE CONTROL: NORMAL
INTERNAL POSITIVE CONTROL: NORMAL
Lab: 0

## 2025-04-21 NOTE — PROGRESS NOTES
"     Gynecologic Procedure Note        Procedure: IUD Insertion     Procedures    Pre procedure indication 1) Desires Mirena  Post procedure indication 1) Desires Mirena    NDC: Mirena 09068-562-31  Lot #: [id number]  Exp Date: 5/2027  BH device    /64   Ht 170.2 cm (67\")   Wt 65.8 kg (145 lb)   LMP  (LMP Unknown)   Breastfeeding Yes   BMI 22.71 kg/m²       The risks, benefits, and alternatives to Mirena were explained at length with the patient. All her questions were answered and consents were signed.  Her LMP was unknown .  Urine Pregnancy Test was Negative.  Patient does not have an allergy to betadine or shellfish.    Time out: immediate members of the procedure team and patient agree to the following: correct patient, correct site, correct procedure to be performed. Tiffani Prince MD      The patient was placed in a dorsal lithotomy position on the examining table in [facility]. A bimanual exam confirmed the uterus was anteverted. A speculum was inserted into the vagina and the cervix was brought into view.  The cervix was prepped with Betadine. The anterior lip of the cervix was then grasped with a single-tooth tenaculum. The endometrial cavity was then sounded to 7 centimeters. The sealed Mirena package was opened and the IUD was removed in a sterile fashion.    The upper edge of the depth setting the flange was set at the uterine sound measurement. The  was then carefully advanced to the cervical canal into the uterus to the level of the fundus.  The slider was then retracted about 1 cm and deployed the device. The device was then gently advanced to the fundus. The IUD was then released by pulling the slider down all the way. The  was removed carefully from the uterus. The threads were then cut leaving 2-3 cm visible outside of the cervix.  The single-tooth tenaculum was removed from the anterior lip. Good hemostasis was noted.   All other instruments were removed from the vagina. "       The patient tolerated the procedure well with a mild amount of discomfort.  She was monitored for 10 minutes prior to discharge.      There were no complications.    The patient was counseled about the need to return in 4 weeks for IUD check.     She was counseled about the need to use a backup method of contraception such as condoms until her post insertion exam was performed. The patient verbalized understanding when the IUD will need to be removed/replaced. Written information was given to the patient.  The patient is counseled to contact us if she has any significant or increasing bleeding, pain, fever, chills, or other concerns. She is instructed to see a doctor right away if she believes that she may be pregnant at any time with the IUD in place.    Return in about 4 weeks (around 5/19/2025) for Recheck, U/S & IUD Recheck.      Tiffani Prince MD  04/21/2025

## 2025-05-27 ENCOUNTER — RESULTS FOLLOW-UP (OUTPATIENT)
Dept: CARDIOLOGY | Facility: HOSPITAL | Age: 30
End: 2025-05-27
Payer: COMMERCIAL

## 2025-05-27 PROCEDURE — 93227 XTRNL ECG REC<48 HR R&I: CPT | Performed by: INTERNAL MEDICINE

## 2025-05-27 NOTE — PROGRESS NOTES
We have received the results of your recent cardiac monitor. We received a total of two days worth of data. Your average heart rate during this time was 72 which is acceptable. You did not have any abnormal heart rhythms during this time. If you have additional questions or concerns regarding these results, please reach out and let me know.

## 2025-05-30 ENCOUNTER — OFFICE VISIT (OUTPATIENT)
Dept: OBSTETRICS AND GYNECOLOGY | Facility: CLINIC | Age: 30
End: 2025-05-30
Payer: COMMERCIAL

## 2025-05-30 VITALS
WEIGHT: 142 LBS | HEIGHT: 67 IN | BODY MASS INDEX: 22.29 KG/M2 | DIASTOLIC BLOOD PRESSURE: 62 MMHG | SYSTOLIC BLOOD PRESSURE: 102 MMHG

## 2025-05-30 DIAGNOSIS — Z30.431 IUD CHECK UP: Primary | ICD-10-CM

## 2025-05-30 PROCEDURE — 99213 OFFICE O/P EST LOW 20 MIN: CPT | Performed by: OBSTETRICS & GYNECOLOGY

## 2025-05-30 NOTE — PROGRESS NOTES
"    Chief Complaint   Patient presents with    Contraception     IUD check         Subjective   HPI  Ariane Vasquez is a 30 y.o. female, , who presents for IUD check follow up. She had a Mirena placed on 2025. Since the IUD placement, the patient reports non-menstrual spotting for 2 weeks after placement. She has not had a period since the IUD was placed.    The additional following portions of the patient's history were reviewed and updated as appropriate: allergies, current medications, and problem list.    Did the patient have u/s today? Yes.  Findings showed IUD had correct placement.  I have personally evaluated the U/S and agree with the findings. Tiffani Prince MD    Review of Systems   Constitutional: Negative.    HENT: Negative.     Eyes: Negative.    Respiratory: Negative.     Cardiovascular: Negative.    Gastrointestinal: Negative.    Endocrine: Negative.    Genitourinary: Negative.    Musculoskeletal: Negative.    Skin: Negative.    Allergic/Immunologic: Negative.    Neurological: Negative.    Hematological: Negative.    Psychiatric/Behavioral: Negative.       All other systems reviewed and are negative.     I have reviewed and agree with the HPI, ROS, and historical information as entered above. Tiffani Prince MD      Objective   /62   Ht 170.2 cm (67\")   Wt 64.4 kg (142 lb)   Breastfeeding Yes   BMI 22.24 kg/m²     Physical Exam  Vitals and nursing note reviewed.   Constitutional:       Appearance: Normal appearance.   HENT:      Head: Normocephalic and atraumatic.   Eyes:      General: No scleral icterus.     Pupils: Pupils are equal, round, and reactive to light.   Pulmonary:      Effort: Pulmonary effort is normal.      Breath sounds: Normal breath sounds.   Abdominal:      General: Bowel sounds are normal. There is no distension.      Palpations: Abdomen is soft.      Tenderness: There is no abdominal tenderness.   Musculoskeletal:         General: Normal range of " motion.      Cervical back: Normal range of motion and neck supple.      Right lower leg: No edema.      Left lower leg: No edema.   Skin:     General: Skin is warm and dry.   Neurological:      General: No focal deficit present.      Mental Status: She is alert and oriented to person, place, and time.   Psychiatric:         Mood and Affect: Mood normal.         Behavior: Behavior normal. Behavior is cooperative.         Assessment & Plan     Assessment     Problem List Items Addressed This Visit    None  Visit Diagnoses         IUD check up    -  Primary            Iud in correctposition and she has no complaints.     Plan     Return to office PRN  Return for Annual physical.  I spent 20 minutes caring for Ariaen on this date of service. This time includes time spent by me in the following activities:preparing for the visit, reviewing tests, obtaining and/or reviewing a separately obtained history, ordering medications, tests, or procedures, and documenting information in the medical record        Tiffani Prince MD  05/30/2025